# Patient Record
Sex: MALE | Race: WHITE | Employment: OTHER
[De-identification: names, ages, dates, MRNs, and addresses within clinical notes are randomized per-mention and may not be internally consistent; named-entity substitution may affect disease eponyms.]

---

## 2017-01-03 ENCOUNTER — TELEPHONE (OUTPATIENT)
Dept: CASE MANAGEMENT | Age: 67
End: 2017-01-03

## 2017-04-25 ENCOUNTER — TELEPHONE (OUTPATIENT)
Dept: PULMONOLOGY | Age: 67
End: 2017-04-25

## 2017-04-25 DIAGNOSIS — R59.0 MEDIASTINAL ADENOPATHY: Primary | ICD-10-CM

## 2017-05-04 ENCOUNTER — HOSPITAL ENCOUNTER (OUTPATIENT)
Dept: CT IMAGING | Age: 67
Discharge: HOME | End: 2017-05-04
Payer: MEDICARE

## 2017-05-04 DIAGNOSIS — R59.0 MEDIASTINAL ADENOPATHY: ICD-10-CM

## 2017-05-04 PROCEDURE — 71250 CT THORAX DX C-: CPT

## 2017-05-25 ENCOUNTER — OFFICE VISIT (OUTPATIENT)
Dept: PULMONOLOGY | Age: 67
End: 2017-05-25
Payer: MEDICARE

## 2017-05-25 VITALS
RESPIRATION RATE: 18 BRPM | SYSTOLIC BLOOD PRESSURE: 138 MMHG | HEART RATE: 66 BPM | TEMPERATURE: 98.2 F | DIASTOLIC BLOOD PRESSURE: 86 MMHG | BODY MASS INDEX: 36.36 KG/M2 | HEIGHT: 70 IN | WEIGHT: 254 LBS | OXYGEN SATURATION: 94 %

## 2017-05-25 DIAGNOSIS — G25.81 RLS (RESTLESS LEGS SYNDROME): ICD-10-CM

## 2017-05-25 DIAGNOSIS — E66.9 RESTRICTIVE LUNG DISEASE SECONDARY TO OBESITY: ICD-10-CM

## 2017-05-25 DIAGNOSIS — Z99.89 OSA ON CPAP: Primary | ICD-10-CM

## 2017-05-25 DIAGNOSIS — F17.200 TOBACCO DEPENDENCE: ICD-10-CM

## 2017-05-25 DIAGNOSIS — G47.33 OSA ON CPAP: Primary | ICD-10-CM

## 2017-05-25 DIAGNOSIS — J44.9 CHRONIC OBSTRUCTIVE PULMONARY DISEASE, UNSPECIFIED COPD TYPE (HCC): ICD-10-CM

## 2017-05-25 DIAGNOSIS — J98.4 RESTRICTIVE LUNG DISEASE SECONDARY TO OBESITY: ICD-10-CM

## 2017-05-25 PROCEDURE — 3023F SPIROM DOC REV: CPT | Performed by: INTERNAL MEDICINE

## 2017-05-25 PROCEDURE — G8926 SPIRO NO PERF OR DOC: HCPCS | Performed by: INTERNAL MEDICINE

## 2017-05-25 PROCEDURE — 99214 OFFICE O/P EST MOD 30 MIN: CPT | Performed by: INTERNAL MEDICINE

## 2017-05-25 PROCEDURE — 4004F PT TOBACCO SCREEN RCVD TLK: CPT | Performed by: INTERNAL MEDICINE

## 2017-05-25 PROCEDURE — G8417 CALC BMI ABV UP PARAM F/U: HCPCS | Performed by: INTERNAL MEDICINE

## 2017-05-25 PROCEDURE — G8427 DOCREV CUR MEDS BY ELIG CLIN: HCPCS | Performed by: INTERNAL MEDICINE

## 2017-05-25 PROCEDURE — 3017F COLORECTAL CA SCREEN DOC REV: CPT | Performed by: INTERNAL MEDICINE

## 2017-11-16 ENCOUNTER — TELEPHONE (OUTPATIENT)
Dept: PULMONOLOGY | Age: 67
End: 2017-11-16

## 2017-11-16 ENCOUNTER — OFFICE VISIT (OUTPATIENT)
Dept: PULMONOLOGY | Age: 67
End: 2017-11-16
Payer: MEDICARE

## 2017-11-16 VITALS
BODY MASS INDEX: 35.56 KG/M2 | TEMPERATURE: 97.8 F | HEART RATE: 60 BPM | WEIGHT: 254 LBS | HEIGHT: 71 IN | DIASTOLIC BLOOD PRESSURE: 78 MMHG | SYSTOLIC BLOOD PRESSURE: 160 MMHG | RESPIRATION RATE: 16 BRPM | OXYGEN SATURATION: 93 %

## 2017-11-16 DIAGNOSIS — E66.9 CLASS 2 OBESITY WITH BODY MASS INDEX (BMI) OF 35.0 TO 35.9 IN ADULT, UNSPECIFIED OBESITY TYPE, UNSPECIFIED WHETHER SERIOUS COMORBIDITY PRESENT: ICD-10-CM

## 2017-11-16 DIAGNOSIS — Z99.89 OSA ON CPAP: Primary | ICD-10-CM

## 2017-11-16 DIAGNOSIS — G25.81 RLS (RESTLESS LEGS SYNDROME): ICD-10-CM

## 2017-11-16 DIAGNOSIS — J43.9 MIXED RESTRICTIVE AND OBSTRUCTIVE LUNG DISEASE (HCC): ICD-10-CM

## 2017-11-16 DIAGNOSIS — R91.1 LUNG NODULE: ICD-10-CM

## 2017-11-16 DIAGNOSIS — J44.9 CHRONIC OBSTRUCTIVE PULMONARY DISEASE, UNSPECIFIED COPD TYPE (HCC): ICD-10-CM

## 2017-11-16 DIAGNOSIS — G47.33 OSA ON CPAP: Primary | ICD-10-CM

## 2017-11-16 DIAGNOSIS — J98.4 MIXED RESTRICTIVE AND OBSTRUCTIVE LUNG DISEASE (HCC): ICD-10-CM

## 2017-11-16 DIAGNOSIS — F17.200 TOBACCO DEPENDENCE: ICD-10-CM

## 2017-11-16 PROCEDURE — 4040F PNEUMOC VAC/ADMIN/RCVD: CPT | Performed by: INTERNAL MEDICINE

## 2017-11-16 PROCEDURE — G8417 CALC BMI ABV UP PARAM F/U: HCPCS | Performed by: INTERNAL MEDICINE

## 2017-11-16 PROCEDURE — 99215 OFFICE O/P EST HI 40 MIN: CPT | Performed by: INTERNAL MEDICINE

## 2017-11-16 PROCEDURE — 1123F ACP DISCUSS/DSCN MKR DOCD: CPT | Performed by: INTERNAL MEDICINE

## 2017-11-16 PROCEDURE — 3023F SPIROM DOC REV: CPT | Performed by: INTERNAL MEDICINE

## 2017-11-16 PROCEDURE — G8482 FLU IMMUNIZE ORDER/ADMIN: HCPCS | Performed by: INTERNAL MEDICINE

## 2017-11-16 PROCEDURE — 3017F COLORECTAL CA SCREEN DOC REV: CPT | Performed by: INTERNAL MEDICINE

## 2017-11-16 PROCEDURE — 4004F PT TOBACCO SCREEN RCVD TLK: CPT | Performed by: INTERNAL MEDICINE

## 2017-11-16 PROCEDURE — G8926 SPIRO NO PERF OR DOC: HCPCS | Performed by: INTERNAL MEDICINE

## 2017-11-16 PROCEDURE — G8428 CUR MEDS NOT DOCUMENT: HCPCS | Performed by: INTERNAL MEDICINE

## 2017-11-16 NOTE — PROGRESS NOTES
PULMONARY OP  PROGRESS NOTE      Patient:  Anna Massey  YOB: 1950    MRN: [id number]     Acct:        Pt seen and Chart reviewed. Mr/Ms Anna Massey is here in followup for     1. WAN on CPAP     2. Chronic obstructive pulmonary disease, unspecified COPD type (HCC)     3. Lung nodule     4. RLS (restless legs syndrome)     5. Mixed restrictive and obstructive lung disease (HCC)     6. Class 2 obesity with body mass index (BMI) of 35.0 to 35.9 in adult, unspecified obesity type, unspecified whether serious comorbidity present        Here for follow up for WAN, copd, restrictive disease, obesity, tobacco dependence, RLS and lung nodule  Using CPAP every night and could not sleep without it, he is on auto cpap and data available from cpap and his AHI is still 22 and CA 8/ hr also, on methadone, c/o daytime fatigue and tiredness, denies any problem with cpap and on auto cpap with P min of 6 and P max of 16 cm  Stable TAI only and is chronic but not as active and denies any worsening cough, wheezing or worsening SOB, no orthopnea and PND, doing well  He use albuterol as needed and almost twice daily  Still smoking cigarette and 3/4 PPD per day  He had screening Ct  which showed he has 6 mm lung nodule and paratracheal LN scheduled for repeat ct scan and last ct scan showed stable lung nodule and should get ct scan in April 2018  His RLS symptoms are well controlled on requip   Patient has been doing well since last visit. Pt has not been hospitalized or been to er since last visit. Has been using meds as recommended. No dry mouth upon awakening. Positive Fatigue and tiredness during the day. Goes to sleep at 11 30 pm to 1 am, wakes up 5 to 6 am. It takes few minutes to fall asleep. Takes no nap during the day . No headache in am. No car wrecks or near wrecks because of the sleepiness. No nodding off while driving. No weight gain.  No forgetfulness or decreased concentration. No nasal congestion or obstruction at night. Using CPAP 6-8 hr/night. No leg jerks during sleep. No restless feelings in legs at nigh ( takes requip)t. No numbness or burning in leg or feet. No leg aches or cramps . No loss of muscle strength when angry or laugh. No hallucination when dozing off or waking up from sleep. No paralysis upon awakening from sleep or going to sleep. No teeth grinding, nightmares, sleep walking. No night time panic attacks. ESS is 11    ESS:   Sitting and reading 1  Watching TV 3  Sitting inactive in a public place (e.g a theater or a meeting) 1  As a passenger in a car for an hour without a break 0  Lying down to rest in the afternoon when circumstances permit 3  Sitting and talking to someone 1  Sitting quietly after a lunch without alcohol 2  In a car, while stopped for a few minutes in traffic 0    Subjective:   Review of Systems - General ROS: negative and positive for fatigue  ENT ROS: negative for post nasal drip and nasal congestion  Allergy and Immunology ROS: negative  Respiratory ROS: no cough, no shortness of breath on exertion , no wheezing  Cardiovascular ROS: no chest pain or dyspnea on exertion  Gastrointestinal ROS: no abdominal pain, change in bowel habits, or black or bloody stools  Musculoskeletal ROS: positive for back pain  NO snoring, positive excessive daytime sleepiness, disrupted sleep, no naps  CNS: negative for dizziness, diplopia, weakness, syncope  Skin: no rash  : negative for hematuria, nocturia and dysuria  Hem/Onc: no easy bruising and no easy bleeding    Allergies:  No Known Allergies    Medications:    Current Outpatient Prescriptions:     Oxiconazole Nitrate 1 % CREA, Apply topically, Disp: , Rfl:     Ciclopirox 1 % SHAM, Apply topically, Disp: , Rfl:     metroNIDAZOLE (METROGEL) 1 % gel, Apply topically daily Apply topically daily. , Disp: , Rfl:     gabapentin (NEURONTIN) 800 MG tablet, Take 800 mg by clicks or gallops  Abdomen - soft, nontender, nondistended, no masses or organomegaly  Neurological - alert, oriented, normal speech, no focal findings or movement disorder noted}  Extremities - peripheral pulses normal, + pedal edema, no clubbing or cyanosis  Skin - normal coloration and turgor, no rashes, no suspicious skin lesions noted     Labs:  None    CBC: No results for input(s): WBC, HGB, PLT in the last 72 hours. BMP:  No results for input(s): NA, K, CL, CO2, BUN, CREATININE, GLUCOSE in the last 72 hours. S. Calcium:No results for input(s): CALCIUM in the last 72 hours. S. Ionized Calcium:No results for input(s): IONCA in the last 72 hours. S. Magnesium:No results for input(s): MG in the last 72 hours. S. Phosphorus:No results for input(s): PHOS in the last 72 hours. S. Glucose:No results for input(s): POCGLU in the last 72 hours. Glycosylated hemoglobin A1C: No results for input(s): LABA1C in the last 72 hours. Pulmonary Functions Testing Results:  DATE: 10/11/2016     INTERPRETATION:  Flow volume loop shows a restrictive defect. FEV1 is 67% of predicted. FVC is 62% of predicted. Ratio of FEV1 to FVC is 81. Lung volumes by body  box total lung capacity 80% of predicted, RV is 121% of predicted,  diffusion capacity is 71% of predicted. No results found for: FEV1, FVC, FYU1WFG, TLC, DLCO    Blood Gases: No results found for: PH, PCO2, PO2, HCO3, O2SAT    Radiological reports:    CXR      CT Scans      5/4/17  1. Stable benign 6 mm pulmonary nodule right upper lobe   2. Stable prominent mediastinal lymph nodes       4/25/2016                                      1. 6 mm noncalcified pulmonary nodule right upper lobe   2.  Prominent left hilar lymph node and enlarged pretracheal lymph node     I have reviewed the Ct chest and R paratracheal LN is small and likley benign and L Hilar LN difficult to evaluate without contrast      EKG:   ECHO:     Compliance data seen from 10/15/17 to 11/13/17 30 days  97% compliance on auto cpap 16/ 6 cm AHI 22 Central apnea 8/hr min  average use 6H and 4 min      Compliance data seen from march to April  30 days  100% compliance on cpap 13 cm AHI 6 and average use 6H and 18      Assessment:    ICD-10-CM ICD-9-CM    1. WAN on CPAP G47.33 327.23     Z99.89 V46.8    2. Chronic obstructive pulmonary disease, unspecified COPD type ([facility] J44.9 496    3. Lung nodule R91.1 793.11    4. RLS (restless legs syndrome) G25.81 333.94    5. Mixed restrictive and obstructive lung disease ([facility] [address] [facility] J44.9 496     J98.4 518.89    6.  Class 2 obesity with body mass index (BMI) of 35.0 to 35.9 in adult, unspecified obesity type, unspecified whether serious comorbidity present E66.9 278.00     Z68.35 V85.35      CT CHEST FOR FOLLOW UP OF RUL LUNG NODULE AND MEDIASTINAL ADENOPATHY SEEN AND NO CHANGE IN R PARATRACHEAL LN AND NO CHANGE IN RUL LUNG NODULE AS COMPARE TO CT IN April AND NOV 2016 AND NOW IN MAY 2017    PLAN:    WILL NEED FOLLOW UP FOR 2 YEARS AND THEN ANNUAL CT SCAN FOR SCREENING, NEXT CT SCAN IN April 2018  SMOKING CESSATION RECOMMENDED AND DISCUSSED TODAY  PFT'S SEEN FROM 2016 AND WILL SCHEDULE NEXT VISIT  START SPIRIVA RESPIMAT  CONTINUE ALBUTEROL AS NEEDED  CONTINUE REQUIP AT NIGHT    AUTO CPAP AT LEAST 4 HRS QHS  WILL SCHEDULE FOR TITRATION STUDY AS HE IS STILL HAVE AHI OF 22 ON COMPLIANCE DATA AND CENTRAL APNEAS AND ALSO ON METHADONE AND SYMPTOMATIC AND MAY NEED BIPAP WITH BACK RATE   WT LOSS IS RECOMMENDED AND DISCUSSED  FOLLOW GOOD SLEEP HYGEINE INSTRUCTIONS  USE HUMIDIFIER   Rx FOR CPAP SUPPLIES  QUESTIONS ANSWERED PERTAINING TO DIAGNOSIS AND MANAGEMENT  EXPLAINED IMPORTANCE OF COMPLIANCE WITH THERAPY  PLEASE GET COMPLIANCE DATA ONCE AFTER THE TITRATION STUDY DONE    UPTODATE ON FLU VACCINE AND RECOMMENDED ANNUAL FLU VACCINE  UPTODATE ON PNEUMONIA VACCINE HE GETS FROM PCP  RTC IN 6  MONTHS      Riaz Johnston MD             11/16/2017, 9:34 AM    Low Dose CT (LDCT) Lung Screening

## 2017-12-13 ENCOUNTER — HOSPITAL ENCOUNTER (OUTPATIENT)
Dept: SLEEP CENTER | Age: 67
Discharge: HOME | End: 2017-12-13
Payer: MEDICARE

## 2017-12-13 DIAGNOSIS — G47.33 OSA ON CPAP: ICD-10-CM

## 2017-12-13 DIAGNOSIS — Z99.89 OSA ON CPAP: ICD-10-CM

## 2017-12-13 PROCEDURE — 95811 POLYSOM 6/>YRS CPAP 4/> PARM: CPT

## 2017-12-13 ASSESSMENT — SLEEP AND FATIGUE QUESTIONNAIRES
HOW LIKELY ARE YOU TO NOD OFF OR FALL ASLEEP WHILE SITTING QUIETLY AFTER LUNCH WITHOUT ALCOHOL: 0
HOW LIKELY ARE YOU TO NOD OFF OR FALL ASLEEP WHEN YOU ARE A PASSENGER IN A CAR FOR AN HOUR WITHOUT A BREAK: 0
HOW LIKELY ARE YOU TO NOD OFF OR FALL ASLEEP WHILE SITTING AND TALKING TO SOMEONE: 0
HOW LIKELY ARE YOU TO NOD OFF OR FALL ASLEEP WHILE LYING DOWN TO REST IN THE AFTERNOON WHEN CIRCUMSTANCES PERMIT: 0
HOW LIKELY ARE YOU TO NOD OFF OR FALL ASLEEP IN A CAR, WHILE STOPPED FOR A FEW MINUTES IN TRAFFIC: 0
ESS TOTAL SCORE: 4
HOW LIKELY ARE YOU TO NOD OFF OR FALL ASLEEP WHILE SITTING INACTIVE IN A PUBLIC PLACE: 0
HOW LIKELY ARE YOU TO NOD OFF OR FALL ASLEEP WHILE WATCHING TV: 3
HOW LIKELY ARE YOU TO NOD OFF OR FALL ASLEEP WHILE SITTING AND READING: 1

## 2017-12-14 NOTE — PROGRESS NOTES
Patient Name: Joy Acosta   : 1950     Admitting diagnosis: Obstructive sleep apnea (adult) (pediatric) [G47.33]       Medical History:   Past Medical History:   Diagnosis Date    Back pain     Coronary stent     Hypertension     Unspecified sleep apnea            Patient information faxed to [address] [place] [address]. This includes the face sheet, H&P, diagnostic test results and prescription for equipment and pressures.     Fabiola Mcmullen

## 2017-12-21 NOTE — PROGRESS NOTES
[address], [address] [place] [address] [address] [address]                                    SLEEP STUDY    PATIENT NAME: Nanci Felty                      :        1950  MED REC NO:   [id number]                              ROOM:  ACCOUNT NO:   [de-identified]                           ADMIT DATE: 2017  PROVIDER:     Jayde Maza [facility]    SLEEP IMPROVEMENT [place]  INTERPRETATION OF CLINICAL POLYSOMNOGRAPHY    DATE OF STUDY:  2017    REFERRING PROVIDER:  Dr. Nikko Ford:  1. Obstructive sleep apnea syndrome. 2.  Obesity. 3.  Periodic leg movements. PROCEDURE STANDARD:  It was a 1-night CPAP titration. Polysomnography revealed that the patient spent 399 minutes in bed with a  total sleep time of 322 minutes. Sleep efficiency was reduced to 80%. Latency of sleep was normal at 9 minutes. There were 41 sleep stage  changes. There were 13 awakenings during the night. Sleep architecture was normal with 4% stage I, 80% stage II, 0% delta, and  70% REM. Latency to various sleep stages were normal.    Polysomnography did not reveal any other abnormality. Electrocardiogram did not reveal any arrhythmia. Respiratory evaluation revealed while on the CPAP at a final pressure of 15  cm water, the patient had no respiratory event. The lowest oxygen  saturation at these settings being 90%. Movement disorder evaluation revealed mild degree of periodic leg  movements. IMPRESSION:  1. Obstructive sleep apnea syndrome. 2.  Periodic leg movements. 3.  Obesity. 4.  COPD. RECOMMENDATION:  The patient is known to have significant degree of sleep  apnea syndrome that responded well to the use of CPAP with a final pressure  15 cm of water. It is recommended that the patient should continue the use  of CPAP at the present settings.   CPAP by relieving the apnea should  improve his daytime symptoms and also protect the patient from the  morbidity associated with the apnea. The patient should be encouraged to lose weight. The patient should be instructed in proper sleep hygiene techniques. The patient advised to use a heated humidifier along with the CPAP machine  to prevent upper airway dryness. The patient has mild degree of periodic leg movements and does not require  any treatment at this time. The patient has COPD, although new or persistent with general desaturation  was noted. Fabiano Zabala    D:12/20/2017 14:15:13               T: 12/21/2017 7:14:44     [id number]  Job#: [id number]     Doc#: [id number]

## 2018-04-10 ENCOUNTER — HOSPITAL ENCOUNTER (OUTPATIENT)
Dept: PULMONOLOGY | Age: 68
Discharge: HOME | End: 2018-04-10
Payer: MEDICARE

## 2018-04-10 ENCOUNTER — HOSPITAL ENCOUNTER (OUTPATIENT)
Dept: CT IMAGING | Age: 68
Discharge: HOME | End: 2018-04-12
Payer: MEDICARE

## 2018-04-10 DIAGNOSIS — R91.1 LUNG NODULE: ICD-10-CM

## 2018-04-10 PROCEDURE — 6360000002 HC RX W HCPCS: Performed by: INTERNAL MEDICINE

## 2018-04-10 PROCEDURE — 94060 EVALUATION OF WHEEZING: CPT

## 2018-04-10 PROCEDURE — 94726 PLETHYSMOGRAPHY LUNG VOLUMES: CPT

## 2018-04-10 PROCEDURE — 71250 CT THORAX DX C-: CPT

## 2018-04-10 PROCEDURE — 94729 DIFFUSING CAPACITY: CPT

## 2018-04-10 PROCEDURE — 94664 DEMO&/EVAL PT USE INHALER: CPT

## 2018-04-10 RX ORDER — ALBUTEROL SULFATE 2.5 MG/3ML
2.5 SOLUTION RESPIRATORY (INHALATION) ONCE
Status: COMPLETED | OUTPATIENT
Start: 2018-04-10 | End: 2018-04-10

## 2018-04-10 RX ADMIN — ALBUTEROL SULFATE 2.5 MG: 2.5 SOLUTION RESPIRATORY (INHALATION) at 10:31

## 2018-04-26 ENCOUNTER — OFFICE VISIT (OUTPATIENT)
Dept: PULMONOLOGY | Age: 68
End: 2018-04-26
Payer: MEDICARE

## 2018-04-26 VITALS
HEIGHT: 71 IN | OXYGEN SATURATION: 92 % | BODY MASS INDEX: 36.54 KG/M2 | WEIGHT: 261 LBS | RESPIRATION RATE: 16 BRPM | TEMPERATURE: 98.1 F | HEART RATE: 55 BPM | SYSTOLIC BLOOD PRESSURE: 161 MMHG | DIASTOLIC BLOOD PRESSURE: 85 MMHG

## 2018-04-26 DIAGNOSIS — G25.81 RLS (RESTLESS LEGS SYNDROME): ICD-10-CM

## 2018-04-26 DIAGNOSIS — Z99.89 OSA ON CPAP: ICD-10-CM

## 2018-04-26 DIAGNOSIS — J44.9 CHRONIC OBSTRUCTIVE PULMONARY DISEASE, UNSPECIFIED COPD TYPE (HCC): Primary | ICD-10-CM

## 2018-04-26 DIAGNOSIS — E66.9 OBESITY, UNSPECIFIED CLASSIFICATION, UNSPECIFIED OBESITY TYPE, UNSPECIFIED WHETHER SERIOUS COMORBIDITY PRESENT: ICD-10-CM

## 2018-04-26 DIAGNOSIS — G47.33 OSA ON CPAP: ICD-10-CM

## 2018-04-26 DIAGNOSIS — R06.09 DYSPNEA ON EXERTION: ICD-10-CM

## 2018-04-26 DIAGNOSIS — R91.1 LUNG NODULE: ICD-10-CM

## 2018-04-26 PROCEDURE — G8427 DOCREV CUR MEDS BY ELIG CLIN: HCPCS | Performed by: INTERNAL MEDICINE

## 2018-04-26 PROCEDURE — 3023F SPIROM DOC REV: CPT | Performed by: INTERNAL MEDICINE

## 2018-04-26 PROCEDURE — 4040F PNEUMOC VAC/ADMIN/RCVD: CPT | Performed by: INTERNAL MEDICINE

## 2018-04-26 PROCEDURE — 1123F ACP DISCUSS/DSCN MKR DOCD: CPT | Performed by: INTERNAL MEDICINE

## 2018-04-26 PROCEDURE — 99214 OFFICE O/P EST MOD 30 MIN: CPT | Performed by: INTERNAL MEDICINE

## 2018-04-26 PROCEDURE — G8926 SPIRO NO PERF OR DOC: HCPCS | Performed by: INTERNAL MEDICINE

## 2018-04-26 PROCEDURE — 3017F COLORECTAL CA SCREEN DOC REV: CPT | Performed by: INTERNAL MEDICINE

## 2018-04-26 PROCEDURE — G8417 CALC BMI ABV UP PARAM F/U: HCPCS | Performed by: INTERNAL MEDICINE

## 2018-04-26 PROCEDURE — 4004F PT TOBACCO SCREEN RCVD TLK: CPT | Performed by: INTERNAL MEDICINE

## 2018-04-26 RX ORDER — ESCITALOPRAM OXALATE 20 MG/1
20 TABLET ORAL DAILY
COMMUNITY

## 2018-09-07 RX ORDER — FLUTICASONE PROPIONATE 50 MCG
1 SPRAY, SUSPENSION (ML) NASAL PRN
COMMUNITY

## 2018-09-19 ENCOUNTER — OFFICE VISIT (OUTPATIENT)
Dept: UROLOGY | Age: 68
End: 2018-09-19
Payer: MEDICARE

## 2018-09-19 VITALS — BODY MASS INDEX: 36.68 KG/M2 | DIASTOLIC BLOOD PRESSURE: 72 MMHG | SYSTOLIC BLOOD PRESSURE: 140 MMHG | WEIGHT: 263 LBS

## 2018-09-19 DIAGNOSIS — N40.2 PROSTATE NODULE: Primary | ICD-10-CM

## 2018-09-19 PROCEDURE — 1123F ACP DISCUSS/DSCN MKR DOCD: CPT | Performed by: UROLOGY

## 2018-09-19 PROCEDURE — 4040F PNEUMOC VAC/ADMIN/RCVD: CPT | Performed by: UROLOGY

## 2018-09-19 PROCEDURE — G8427 DOCREV CUR MEDS BY ELIG CLIN: HCPCS | Performed by: UROLOGY

## 2018-09-19 PROCEDURE — 4004F PT TOBACCO SCREEN RCVD TLK: CPT | Performed by: UROLOGY

## 2018-09-19 PROCEDURE — 3017F COLORECTAL CA SCREEN DOC REV: CPT | Performed by: UROLOGY

## 2018-09-19 PROCEDURE — G8417 CALC BMI ABV UP PARAM F/U: HCPCS | Performed by: UROLOGY

## 2018-09-19 PROCEDURE — 99204 OFFICE O/P NEW MOD 45 MIN: CPT | Performed by: UROLOGY

## 2018-09-19 PROCEDURE — 1101F PT FALLS ASSESS-DOCD LE1/YR: CPT | Performed by: UROLOGY

## 2018-09-19 RX ORDER — ACETAMINOPHEN 500 MG
500 TABLET ORAL EVERY 6 HOURS PRN
COMMUNITY

## 2018-09-19 ASSESSMENT — ENCOUNTER SYMPTOMS
COLOR CHANGE: 0
EYE PAIN: 0
BACK PAIN: 0
COUGH: 0
SHORTNESS OF BREATH: 0
NAUSEA: 0
WHEEZING: 0
ABDOMINAL PAIN: 0
EYE REDNESS: 0
VOMITING: 0

## 2018-09-19 NOTE — PROGRESS NOTES
Subjective:      Patient ID: Gomez Hall is a 79 y.o. male. HPI  Patient is a 79 y.o. male in no acute distress and alert and oriented to person, place and time. Patient being seen here today as a new patient. Patient did have a recent MRI of his hip. During this MRI his prostate was visualized. Radiology did make a comment that there was a hypoechoic area in the anterior portion of the prostate. Patient did have a recent PSA testing done on September 5, 2018. This is a PSA screening. His PSA was 1.37. Patient does have one paternal uncle with prostate cancer. This was diagnosed in his 76s. Patient has no recent gross hematuria dysuria. Patient has never had any elevations in his PSA according to him. Patient reports no pain today. He's had no unintentional weight loss or decreased appetite. Patient does have back pain and hip pain and leg pain. This is why the MRI of the hip was performed. Otherwise he is pain-free today.     Past Medical History:   Diagnosis Date    Abnormal glucose     Actinic keratosis     Anxiety disorder     Back pain     COPD (chronic obstructive pulmonary disease) (HCC)     Coronary stent     Esophageal reflux     Heart failure, systolic (HCC)     Hyperlipidemia     Hypertension     Hypertension, essential, benign     Lumbar strain     Osteoporosis     Seasonal allergic rhinitis     Unspecified sleep apnea      Past Surgical History:   Procedure Laterality Date    COLONOSCOPY      CORONARY ANGIOPLASTY WITH STENT PLACEMENT      NECK SURGERY      x2    TONSILLECTOMY       Outpatient Encounter Prescriptions as of 9/19/2018   Medication Sig Dispense Refill    acetaminophen (TYLENOL) 500 MG tablet Take 500 mg by mouth every 6 hours as needed for Pain      fluticasone (FLONASE) 50 MCG/ACT nasal spray 1 spray by Each Nare route daily      escitalopram (LEXAPRO) 20 MG tablet Take 20 mg by mouth daily      Umeclidinium Bromide (INCRUSE ELLIPTA) 62.5 MCG/INH AEPB Inhale 1 puff into the lungs daily 1 each 11    Oxiconazole Nitrate 1 % CREA Apply topically      Ciclopirox 1 % SHAM Apply topically      metroNIDAZOLE (METROGEL) 1 % gel Apply topically daily Apply topically daily.  gabapentin (NEURONTIN) 800 MG tablet Take 800 mg by mouth 3 times daily      Albuterol Sulfate (VENTOLIN HFA IN) Inhale 90 mcg into the lungs 2 times daily.  imipramine (TOFRANIL) 25 MG tablet Take 25 mg by mouth nightly.  aspirin 81 MG EC tablet Take 81 mg by mouth daily.  atorvastatin (LIPITOR) 10 MG tablet Take 30 mg by mouth daily.  metoprolol (TOPROL-XL) 50 MG XL tablet Take 100 mg by mouth daily.  nitroGLYCERIN (NITROSTAT) 0.4 MG SL tablet Place 0.4 mg under the tongue every 5 minutes as needed.  ropinirole (REQUIP) 1 MG tablet Take 1 mg by mouth nightly.  methadone (DOLOPHINE) 5 MG tablet Take 5 mg by mouth 2 times daily. No facility-administered encounter medications on file as of 9/19/2018. Current Outpatient Prescriptions on File Prior to Visit   Medication Sig Dispense Refill    fluticasone (FLONASE) 50 MCG/ACT nasal spray 1 spray by Each Nare route daily      escitalopram (LEXAPRO) 20 MG tablet Take 20 mg by mouth daily      Umeclidinium Bromide (INCRUSE ELLIPTA) 62.5 MCG/INH AEPB Inhale 1 puff into the lungs daily 1 each 11    Oxiconazole Nitrate 1 % CREA Apply topically      Ciclopirox 1 % SHAM Apply topically      metroNIDAZOLE (METROGEL) 1 % gel Apply topically daily Apply topically daily.  gabapentin (NEURONTIN) 800 MG tablet Take 800 mg by mouth 3 times daily      Albuterol Sulfate (VENTOLIN HFA IN) Inhale 90 mcg into the lungs 2 times daily.  imipramine (TOFRANIL) 25 MG tablet Take 25 mg by mouth nightly.  aspirin 81 MG EC tablet Take 81 mg by mouth daily.  atorvastatin (LIPITOR) 10 MG tablet Take 30 mg by mouth daily.         metoprolol (TOPROL-XL) 50 MG XL tablet Take 100 mg by mouth daily.  nitroGLYCERIN (NITROSTAT) 0.4 MG SL tablet Place 0.4 mg under the tongue every 5 minutes as needed.  ropinirole (REQUIP) 1 MG tablet Take 1 mg by mouth nightly.  methadone (DOLOPHINE) 5 MG tablet Take 5 mg by mouth 2 times daily. No current facility-administered medications on file prior to visit. Patient has no known allergies. Family History   Problem Relation Age of Onset    Kidney Disease Mother      History   Smoking Status    Current Every Day Smoker    Packs/day: 1.00    Years: 40.00   Smokeless Tobacco    Never Used        History   Alcohol Use No       Vitals:    09/19/18 0856   BP: (!) 140/72     Constitutional: Patient in no acute distress; Neuro: alert and oriented to person place and time. Psych: Mood and affect normal.  Skin: Normal  Lungs: Respiratory effort normal  Cardiovascular:  Normal peripheral pulses  Abdomen: Soft, non-tender, non-distended with no CVA, flank pain, hepatosplenomegaly or hernia. Kidneys normal.  Bladder non-tender and not distended. Lymphatics: no palpable lymphadenopathy  Penis normal and circumcised  Urethral meatus normal  Scrotal exam normal  Testicles normal bilaterally  Epididymis normal bilaterally  No evidence of inguinal hernia  Anus and perineum normal  Normal rectal tone with no masses  Prostate soft, non-tender to palpation. No palpable nodules. Estimated 40 grams. Seminal vesicles not palpable. No results found for this visit on 09/19/18. No results found for: PSA  No results found for: BUN  No results found for: CREATININE  Review of Systems   Constitutional: Negative for appetite change, chills and fever. Eyes: Negative for pain, redness and visual disturbance. Respiratory: Negative for cough, shortness of breath and wheezing. Cardiovascular: Negative for chest pain and leg swelling. Gastrointestinal: Negative for abdominal pain, nausea and vomiting.    Genitourinary: Negative

## 2018-10-25 ENCOUNTER — OFFICE VISIT (OUTPATIENT)
Dept: PULMONOLOGY | Age: 68
End: 2018-10-25
Payer: MEDICARE

## 2018-10-25 VITALS
BODY MASS INDEX: 36.68 KG/M2 | RESPIRATION RATE: 16 BRPM | WEIGHT: 262 LBS | HEART RATE: 79 BPM | DIASTOLIC BLOOD PRESSURE: 67 MMHG | HEIGHT: 71 IN | SYSTOLIC BLOOD PRESSURE: 138 MMHG | TEMPERATURE: 98.2 F | OXYGEN SATURATION: 92 %

## 2018-10-25 DIAGNOSIS — J44.9 CHRONIC OBSTRUCTIVE PULMONARY DISEASE, UNSPECIFIED COPD TYPE (HCC): Primary | ICD-10-CM

## 2018-10-25 DIAGNOSIS — F17.218 CIGARETTE NICOTINE DEPENDENCE WITH OTHER NICOTINE-INDUCED DISORDER: ICD-10-CM

## 2018-10-25 DIAGNOSIS — G47.33 OSA ON CPAP: ICD-10-CM

## 2018-10-25 DIAGNOSIS — Z87.891 PERSONAL HISTORY OF TOBACCO USE: ICD-10-CM

## 2018-10-25 DIAGNOSIS — E66.9 OBESITY (BMI 35.0-39.9 WITHOUT COMORBIDITY): ICD-10-CM

## 2018-10-25 DIAGNOSIS — Z99.89 OSA ON CPAP: ICD-10-CM

## 2018-10-25 DIAGNOSIS — R91.1 LUNG NODULE: ICD-10-CM

## 2018-10-25 DIAGNOSIS — F17.200 TOBACCO DEPENDENCE: ICD-10-CM

## 2018-10-25 PROCEDURE — 4040F PNEUMOC VAC/ADMIN/RCVD: CPT | Performed by: INTERNAL MEDICINE

## 2018-10-25 PROCEDURE — G8484 FLU IMMUNIZE NO ADMIN: HCPCS | Performed by: INTERNAL MEDICINE

## 2018-10-25 PROCEDURE — 1123F ACP DISCUSS/DSCN MKR DOCD: CPT | Performed by: INTERNAL MEDICINE

## 2018-10-25 PROCEDURE — G8417 CALC BMI ABV UP PARAM F/U: HCPCS | Performed by: INTERNAL MEDICINE

## 2018-10-25 PROCEDURE — G8427 DOCREV CUR MEDS BY ELIG CLIN: HCPCS | Performed by: INTERNAL MEDICINE

## 2018-10-25 PROCEDURE — 1101F PT FALLS ASSESS-DOCD LE1/YR: CPT | Performed by: INTERNAL MEDICINE

## 2018-10-25 PROCEDURE — 3017F COLORECTAL CA SCREEN DOC REV: CPT | Performed by: INTERNAL MEDICINE

## 2018-10-25 PROCEDURE — G0296 VISIT TO DETERM LDCT ELIG: HCPCS | Performed by: INTERNAL MEDICINE

## 2018-10-25 PROCEDURE — G8926 SPIRO NO PERF OR DOC: HCPCS | Performed by: INTERNAL MEDICINE

## 2018-10-25 PROCEDURE — 99214 OFFICE O/P EST MOD 30 MIN: CPT | Performed by: INTERNAL MEDICINE

## 2018-10-25 PROCEDURE — 3023F SPIROM DOC REV: CPT | Performed by: INTERNAL MEDICINE

## 2018-10-25 PROCEDURE — 4004F PT TOBACCO SCREEN RCVD TLK: CPT | Performed by: INTERNAL MEDICINE

## 2018-10-25 NOTE — PROGRESS NOTES
in no distress  Mental status - alert, oriented to person, place, and time  Eyes - pupils equal and reactive, extraocular eye movements intact  Nose - normal and patent, no erythema, discharge or polyps  Mouth - mucous membranes moist, pharynx normal without lesions, Small oropharynx, large tongue, Mallampati 2  Neck - supple, no significant adenopathy, thick and short  Chest - No distress, retractions or cyanosis, symmetrical chest movement and clear to auscultation, no wheezes, rales or rhonchi, symmetric air entry  Heart - normal rate, regular rhythm, normal S1, S2, no murmurs, rubs, clicks or gallops  Abdomen - soft, nontender, nondistended, no masses or organomegaly  Neurological - alert, oriented, normal speech, no focal findings or movement disorder noted  Extremities - peripheral pulses normal, + pedal edema, no clubbing or cyanosis  Skin - normal coloration and turgor, no rashes, no suspicious skin lesions noted     Labs:  None    CBC: No results for input(s): WBC, HGB, PLT in the last 72 hours. BMP:  No results for input(s): NA, K, CL, CO2, BUN, CREATININE, GLUCOSE in the last 72 hours. S. Calcium:No results for input(s): CALCIUM in the last 72 hours. S. Ionized Calcium:No results for input(s): IONCA in the last 72 hours. S. Magnesium:No results for input(s): MG in the last 72 hours. S. Phosphorus:No results for input(s): PHOS in the last 72 hours. S. Glucose:No results for input(s): POCGLU in the last 72 hours. Glycosylated hemoglobin A1C: No results for input(s): LABA1C in the last 72 hours. Pulmonary Functions Testing Results:    4/10/18  The flow volume loop is restrictive. FEV1 63% predicated, FVC 60%  predicated. There was a 11% bronchodilator change in FEV1.  FEV1/FVC ratio is 79, post bronchodilator 81. Lung volumes by body box show TLC 85% predicted. % predicted. Diffusion capacity is 62% predicated.   Airway resistance normal.    DATE: 10/11/2016     INTERPRETATION:  Flow

## 2018-10-25 NOTE — PATIENT INSTRUCTIONS
do to reduce your risk of dying of lung cancer is to quit. For this screening to be covered by Medicare and most other insurers, strict criteria must be met. If you do not meet these criteria, but still wish to undergo LDCT testing, you will be required to sign a waiver indicating your willingness to pay for the scan.

## 2019-03-14 LAB — PROSTATE SPECIFIC ANTIGEN: 2.14 NG/ML

## 2019-03-20 ENCOUNTER — OFFICE VISIT (OUTPATIENT)
Dept: UROLOGY | Age: 69
End: 2019-03-20
Payer: MEDICARE

## 2019-03-20 VITALS
WEIGHT: 252 LBS | BODY MASS INDEX: 35.28 KG/M2 | HEIGHT: 71 IN | DIASTOLIC BLOOD PRESSURE: 80 MMHG | SYSTOLIC BLOOD PRESSURE: 158 MMHG

## 2019-03-20 DIAGNOSIS — N40.2 PROSTATE NODULE: Primary | ICD-10-CM

## 2019-03-20 PROCEDURE — G8427 DOCREV CUR MEDS BY ELIG CLIN: HCPCS | Performed by: NURSE PRACTITIONER

## 2019-03-20 PROCEDURE — 4040F PNEUMOC VAC/ADMIN/RCVD: CPT | Performed by: NURSE PRACTITIONER

## 2019-03-20 PROCEDURE — G8484 FLU IMMUNIZE NO ADMIN: HCPCS | Performed by: NURSE PRACTITIONER

## 2019-03-20 PROCEDURE — 3017F COLORECTAL CA SCREEN DOC REV: CPT | Performed by: NURSE PRACTITIONER

## 2019-03-20 PROCEDURE — 1101F PT FALLS ASSESS-DOCD LE1/YR: CPT | Performed by: NURSE PRACTITIONER

## 2019-03-20 PROCEDURE — G8417 CALC BMI ABV UP PARAM F/U: HCPCS | Performed by: NURSE PRACTITIONER

## 2019-03-20 PROCEDURE — 4004F PT TOBACCO SCREEN RCVD TLK: CPT | Performed by: NURSE PRACTITIONER

## 2019-03-20 PROCEDURE — 99213 OFFICE O/P EST LOW 20 MIN: CPT | Performed by: NURSE PRACTITIONER

## 2019-03-20 PROCEDURE — 1123F ACP DISCUSS/DSCN MKR DOCD: CPT | Performed by: NURSE PRACTITIONER

## 2019-03-20 ASSESSMENT — ENCOUNTER SYMPTOMS
NAUSEA: 0
ABDOMINAL PAIN: 0
CONSTIPATION: 0
SHORTNESS OF BREATH: 0
BACK PAIN: 0
COUGH: 0
EYE PAIN: 0
EYE REDNESS: 0
COLOR CHANGE: 0
WHEEZING: 0
VOMITING: 0

## 2019-03-21 DIAGNOSIS — N40.2 PROSTATE NODULE: ICD-10-CM

## 2019-04-03 ENCOUNTER — TELEPHONE (OUTPATIENT)
Dept: ONCOLOGY | Age: 69
End: 2019-04-03

## 2019-04-03 NOTE — LETTER
4/3/2019        [address]lo [address]    Dear Marena Skiff:    Your healthcare provider has ordered a low dose CT scan of the chest for lung cancer screening. You will find enclosed, information about CT lung screening. Please review the statement of understanding, you will be asked to sign a copy of this at the time of your CT scan    If you have not already been contacted to make the appointment for your scan, please call our scheduling department at [phone]    Keep in mind that CT lung screening does not take the place of smoking cessation. If you are a current smoker, you will find enclosed smoking cessation resources. Please do not hesitate to contact me if you have any questions or concerns.     [address] [facility],      [facility]  [phone]

## 2019-04-10 ENCOUNTER — HOSPITAL ENCOUNTER (OUTPATIENT)
Dept: CT IMAGING | Age: 69
Discharge: HOME | End: 2019-04-12
Payer: MEDICARE

## 2019-04-10 DIAGNOSIS — Z87.891 PERSONAL HISTORY OF TOBACCO USE: ICD-10-CM

## 2019-04-10 PROCEDURE — G0297 LDCT FOR LUNG CA SCREEN: HCPCS

## 2019-04-25 ENCOUNTER — OFFICE VISIT (OUTPATIENT)
Dept: PULMONOLOGY | Age: 69
End: 2019-04-25
Payer: MEDICARE

## 2019-04-25 VITALS
OXYGEN SATURATION: 94 % | SYSTOLIC BLOOD PRESSURE: 125 MMHG | DIASTOLIC BLOOD PRESSURE: 83 MMHG | RESPIRATION RATE: 22 BRPM | WEIGHT: 246.1 LBS | HEART RATE: 90 BPM | BODY MASS INDEX: 34.45 KG/M2 | HEIGHT: 71 IN | TEMPERATURE: 98.2 F

## 2019-04-25 DIAGNOSIS — G47.33 OSA ON CPAP: ICD-10-CM

## 2019-04-25 DIAGNOSIS — Z99.89 OSA ON CPAP: ICD-10-CM

## 2019-04-25 DIAGNOSIS — J44.9 CHRONIC OBSTRUCTIVE PULMONARY DISEASE, UNSPECIFIED COPD TYPE (HCC): Primary | ICD-10-CM

## 2019-04-25 DIAGNOSIS — F17.200 TOBACCO DEPENDENCE: ICD-10-CM

## 2019-04-25 DIAGNOSIS — R91.1 LUNG NODULE: ICD-10-CM

## 2019-04-25 PROCEDURE — G8926 SPIRO NO PERF OR DOC: HCPCS | Performed by: INTERNAL MEDICINE

## 2019-04-25 PROCEDURE — G8417 CALC BMI ABV UP PARAM F/U: HCPCS | Performed by: INTERNAL MEDICINE

## 2019-04-25 PROCEDURE — 99214 OFFICE O/P EST MOD 30 MIN: CPT | Performed by: INTERNAL MEDICINE

## 2019-04-25 PROCEDURE — 3023F SPIROM DOC REV: CPT | Performed by: INTERNAL MEDICINE

## 2019-04-25 PROCEDURE — 3017F COLORECTAL CA SCREEN DOC REV: CPT | Performed by: INTERNAL MEDICINE

## 2019-04-25 PROCEDURE — 4004F PT TOBACCO SCREEN RCVD TLK: CPT | Performed by: INTERNAL MEDICINE

## 2019-04-25 PROCEDURE — 4040F PNEUMOC VAC/ADMIN/RCVD: CPT | Performed by: INTERNAL MEDICINE

## 2019-04-25 PROCEDURE — 1123F ACP DISCUSS/DSCN MKR DOCD: CPT | Performed by: INTERNAL MEDICINE

## 2019-04-25 PROCEDURE — G8427 DOCREV CUR MEDS BY ELIG CLIN: HCPCS | Performed by: INTERNAL MEDICINE

## 2019-04-25 NOTE — PROGRESS NOTES
day. Goes to sleep at 11 30 pm, wakes up 6 30 am. It takes few minutes to fall asleep. Takes no nap during the day . No headache in am. No car wrecks or near wrecks because of the sleepiness. No nodding off while driving. No weight gain. No forgetfulness or decreased concentration. No nasal congestion or obstruction at night. Using CPAP 6-7 hr/night. No leg jerks during sleep. No restless feelings in legs at nigh ( takes requip)t. No numbness or burning in leg or feet. No leg aches or cramps . No loss of muscle strength when angry or laugh. No hallucination when dozing off or waking up from sleep. No paralysis upon awakening from sleep or going to sleep. No teeth grinding, nightmares, sleep walking. No night time panic attacks. Initially  He was on auto cpap and recently data available from cpap showed his AHI was still 22 and CA 8/ hr also, he is on methadone,  and was on auto cpap with P min of 6 and P max of 16 cm.       ESS is 5    ESS:   Sitting and reading 2  Watching TV 3  Sitting inactive in a public place (e.g a theater or a meeting) 0  As a passenger in a car for an hour without a break 0  Lying down to rest in the afternoon when circumstances permit 0  Sitting and talking to someone 0  Sitting quietly after a lunch without alcohol 0  In a car, while stopped for a few minutes in traffic 0    Subjective:   Review of Systems - General ROS: negative and positive for fatigue  ENT ROS: positive for post nasal drip and nasal congestion  Allergy and Immunology ROS: negative for skin rash and urticaria  Respiratory ROS: no cough, no shortness of breath on exertion , no wheezing  Cardiovascular ROS: no chest pain or dyspnea on exertion  Gastrointestinal ROS: no abdominal pain, change in bowel habits, or black or bloody stools  Musculoskeletal ROS: positive for back pain  NO snoring, positive excessive daytime sleepiness, disrupted sleep, no naps  CNS: negative for dizziness, diplopia, weakness, syncope  Skin: no rash and skin lesion  : negative for hematuria, nocturia and dysuria  Hem/Onc: no easy bruising and no easy bleeding    Allergies:  No Known Allergies    Medications:    Current Outpatient Medications:     acetaminophen (TYLENOL) 500 MG tablet, Take 500 mg by mouth every 6 hours as needed for Pain, Disp: , Rfl:     fluticasone (FLONASE) 50 MCG/ACT nasal spray, 1 spray by Each Nare route daily, Disp: , Rfl:     escitalopram (LEXAPRO) 20 MG tablet, Take 20 mg by mouth daily, Disp: , Rfl:     Umeclidinium Bromide (INCRUSE ELLIPTA) 62.5 MCG/INH AEPB, Inhale 1 puff into the lungs daily, Disp: 1 each, Rfl: 11    Oxiconazole Nitrate 1 % CREA, Apply topically, Disp: , Rfl:     Ciclopirox 1 % SHAM, Apply topically, Disp: , Rfl:     metroNIDAZOLE (METROGEL) 1 % gel, Apply topically daily Apply topically daily. , Disp: , Rfl:     gabapentin (NEURONTIN) 800 MG tablet, Take 800 mg by mouth 3 times daily, Disp: , Rfl:     Albuterol Sulfate (VENTOLIN HFA IN), Inhale 90 mcg into the lungs 2 times daily. , Disp: , Rfl:     imipramine (TOFRANIL) 25 MG tablet, Take 25 mg by mouth nightly.  , Disp: , Rfl:     aspirin 81 MG EC tablet, Take 81 mg by mouth daily. , Disp: , Rfl:     atorvastatin (LIPITOR) 10 MG tablet, Take 30 mg by mouth daily. , Disp: , Rfl:     metoprolol (TOPROL-XL) 50 MG XL tablet, Take 100 mg by mouth daily. , Disp: , Rfl:     ropinirole (REQUIP) 1 MG tablet, Take 1 mg by mouth nightly.  , Disp: , Rfl:     methadone (DOLOPHINE) 5 MG tablet, Take 5 mg by mouth 2 times daily. , Disp: , Rfl:     nitroGLYCERIN (NITROSTAT) 0.4 MG SL tablet, Place 0.4 mg under the tongue every 5 minutes as needed. , Disp: , Rfl:       Objective:    Physical Exam:  Vitals:   /83 (Site: Left Upper Arm, Position: Sitting, Cuff Size: Medium Adult)   Pulse 90   Temp 98.2 °F (36.8 °C) (Tympanic)   Resp 22   Ht 5' 11\" (1.803 m)   Wt 246 lb 1.6 oz (111.6 kg)   SpO2 94%   BMI 34.32 kg/m²   Last 3 weights:   Wt Readings from Last 3 Encounters:   04/25/19 246 lb 1.6 oz (111.6 kg)   03/20/19 252 lb (114.3 kg)   10/25/18 262 lb (118.8 kg)     Body mass index is 34.32 kg/m². Physical Examination:   General appearance - alert, well appearing, and in no distress  Mental status - alert, oriented to person, place, and time  Eyes - pupils equal and reactive, extraocular eye movements intact  Nose - normal and patent, no erythema, discharge or polyps  Mouth - mucous membranes moist, pharynx normal without lesions, Small oropharynx, large tongue, Mallampati 2  Neck - supple, no significant adenopathy, thick and short  Chest - No distress, retractions or cyanosis, symmetrical chest movement and clear to auscultation, no wheezes, rales or rhonchi, symmetric air entry  Heart - normal rate, regular rhythm, normal S1, S2, no murmurs, rubs, clicks or gallops  Abdomen - soft, nontender, nondistended, no masses or organomegaly  Neurological - alert, oriented, normal speech, no focal findings or movement disorder noted  Extremities - peripheral pulses normal, + pedal edema, no clubbing or cyanosis  Skin - normal coloration and turgor, no rashes, no suspicious skin lesions noted     Labs:  None    CBC: No results for input(s): WBC, HGB, PLT in the last 72 hours. BMP:  No results for input(s): NA, K, CL, CO2, BUN, CREATININE, GLUCOSE in the last 72 hours. S. Calcium:No results for input(s): CALCIUM in the last 72 hours. S. Ionized Calcium:No results for input(s): IONCA in the last 72 hours. S. Magnesium:No results for input(s): MG in the last 72 hours. S. Phosphorus:No results for input(s): PHOS in the last 72 hours. S. Glucose:No results for input(s): POCGLU in the last 72 hours. Glycosylated hemoglobin A1C: No results for input(s): LABA1C in the last 72 hours. Pulmonary Functions Testing Results:    4/10/18  The flow volume loop is restrictive. FEV1 63% predicated, FVC 60%  predicated.   There was a 11% bronchodilator change in FEV1.  FEV1/FVC ratio is 79, post bronchodilator 81. Lung volumes by body box show TLC 85% predicted. % predicted. Diffusion capacity is 62% predicated. Airway resistance normal.    DATE: 10/11/2016     INTERPRETATION:  Flow volume loop shows a restrictive defect. FEV1 is 67% of predicted. FVC is 62% of predicted. Ratio of FEV1 to FVC is 81. Lung volumes by body  box total lung capacity 80% of predicted, RV is 121% of predicted,  diffusion capacity is 71% of predicted. No results found for: FEV1, FVC, DOY6GSA, TLC, DLCO    Blood Gases: No results found for: PH, PCO2, PO2, HCO3, O2SAT    Radiological reports:    CXR      CT Scans    0/10/19  Stable 6 mm solid noncalcified nodule in the posterior segment right upper   lobe.        04/10/18  1. Stable 6 mm noncalcified pulmonary nodule right upper lobe   2. Stable prominent left hilar lymph node and enlarged pretracheal lymph node          5/4/17  1. Stable benign 6 mm pulmonary nodule right upper lobe   2. Stable prominent mediastinal lymph nodes       4/25/2016                                      1. 6 mm noncalcified pulmonary nodule right upper lobe   2. Prominent left hilar lymph node and enlarged pretracheal lymph node     I have reviewed the Ct chest and R paratracheal LN is small and likley benign and L Hilar LN difficult to evaluate without contrast      EKG:   ECHO:     Compliance data seen from 10/15/17 to 11/13/17 30 days  97% compliance on auto cpap 16/ 6 cm AHI 22 Central apnea 8/hr min  average use 6H and 4 min       Compliance data seen from march to April 2019 for 30 days  100% compliance on cpap 15 cm AHI 12 and average use 6H and 18      Assessment:    ICD-10-CM    1. Chronic obstructive pulmonary disease, unspecified COPD type ([facility] [address] J44.9    2. WAN on CPAP G47.33     Z99.89    3.  Lung nodule R91.1      CT CHEST FROM FOR FOLLOW UP SEEN FOR RUL LUNG NODULE AND MEDIASTINAL ADENOPATHY SEEN AND NO CHANGE IN R PARATRACHEAL LN AND NO CHANGE IN RUL LUNG NODULE AS COMPARE TO CT IN 2016 AND IN MAY 2017    PLAN:    LUNG NODULE STABLE FOR 2 YEARS   ANNUAL CT SCAN FOR SCREENING, NEXT CT SCAN IN APRIL 2020  SMOKING CESSATION RECOMMENDED AND DISCUSSED TODAY  PFT'S SEEN FROM 2016 AND ALSO FROM April 2019 AND ALMOST STABLE FINDINGS ON PFTS  CONTINUE INCRUSE ONCE DAILY  CONTINUE ALBUTEROL AS NEEDED  CONTINUE REQUIP AT NIGHT  FLU VACCINE ANNUALLY  WILL NEED BOOSTER OF PNEUMOVAX AND NEXT YEAR PREVNAR 13, HE GETS VACCINE FROM PCP    INCREASE CPAP PRESSURE TO 16 Cm AS COMPLIANCE DATA SHOWED AHI  OF 12 ON CPAP  PRESSURE OF 15 CM BUT HE IS ON METHADONE AND MOY HAVE CENTRAL APNEA NOT RESPONDING TO CPAP AND MOY WILL NEED BIPAP OR BIPAP/ST   CONTINUE CPAP AT LEAST 4 HRS QHS  WT LOSS IS RECOMMENDED AND DISCUSSED  FOLLOW GOOD SLEEP HYGEINE INSTRUCTIONS  USE HUMIDIFIER   Rx FOR CPAP SUPPLIES  QUESTIONS ANSWERED PERTAINING TO DIAGNOSIS AND MANAGEMENT  EXPLAINED IMPORTANCE OF COMPLIANCE WITH THERAPY  PLEASE GET COMPLIANCE DATA BEFORE NEXT VISIT    RTC IN 6  MONTHS      Boni Hussein MD             4/25/2019, 12:06 PM        Social History     Tobacco Use   Smoking Status Current Every Day Smoker    Packs/day: 1.00    Years: 40.00    Pack years: 40.00   Smokeless Tobacco Never Used           Low Dose CT (LDCT) Lung Screening criteria met   Age 50-69   Pack year smoking >30   Still smoking or less than 15 year since quit   No sign or symptoms of lung cancer   > 11 months since last LDCT     Risks and benefits of lung cancer screening with LDCT scans discussed:    Significance of positive screen - False-positive LDCT results often occur. 95% of all positive results do not lead to a diagnosis of cancer. Usually further imaging can resolve most false-positive results; however, some patients may require invasive procedures.     Over diagnosis risk - 10% to 12% of screen-detected lung cancer cases are over diagnosed--that is, the cancer would not have been detected in the patient's lifetime without the screening. Need for follow up screens annually to continue lung cancer screening effectiveness     Risks associated with radiation from annual LDCT- Radiation exposure is about the same as for a mammogram, which is about 1/3 of the annual background radiation exposure from everyday life. Starting screening at age 54 is not likely to increase cancer risk from radiation exposure. Patients with comorbidities resulting in life expectancy of < 10 years, or that would preclude treatment of an abnormality identified on CT, should not be screened due to lack of benefit.     To obtain maximal benefit from this screening, smoking cessation and long-term abstinence from smoking is critical

## 2019-09-18 ENCOUNTER — OFFICE VISIT (OUTPATIENT)
Dept: UROLOGY | Age: 69
End: 2019-09-18
Payer: MEDICARE

## 2019-09-18 VITALS
BODY MASS INDEX: 35.84 KG/M2 | DIASTOLIC BLOOD PRESSURE: 90 MMHG | HEIGHT: 71 IN | WEIGHT: 256 LBS | SYSTOLIC BLOOD PRESSURE: 140 MMHG

## 2019-09-18 DIAGNOSIS — N40.2 PROSTATE NODULE: Primary | ICD-10-CM

## 2019-09-18 PROCEDURE — 1123F ACP DISCUSS/DSCN MKR DOCD: CPT | Performed by: UROLOGY

## 2019-09-18 PROCEDURE — 3017F COLORECTAL CA SCREEN DOC REV: CPT | Performed by: UROLOGY

## 2019-09-18 PROCEDURE — G8427 DOCREV CUR MEDS BY ELIG CLIN: HCPCS | Performed by: UROLOGY

## 2019-09-18 PROCEDURE — 4004F PT TOBACCO SCREEN RCVD TLK: CPT | Performed by: UROLOGY

## 2019-09-18 PROCEDURE — G8417 CALC BMI ABV UP PARAM F/U: HCPCS | Performed by: UROLOGY

## 2019-09-18 PROCEDURE — 99213 OFFICE O/P EST LOW 20 MIN: CPT | Performed by: UROLOGY

## 2019-09-18 PROCEDURE — 4040F PNEUMOC VAC/ADMIN/RCVD: CPT | Performed by: UROLOGY

## 2019-09-18 ASSESSMENT — ENCOUNTER SYMPTOMS
COLOR CHANGE: 0
ABDOMINAL PAIN: 0
COUGH: 0
WHEEZING: 0
NAUSEA: 0
VOMITING: 0
EYE PAIN: 0
SHORTNESS OF BREATH: 0
BACK PAIN: 0
EYE REDNESS: 0

## 2019-10-31 ENCOUNTER — OFFICE VISIT (OUTPATIENT)
Dept: PULMONOLOGY | Age: 69
End: 2019-10-31
Payer: MEDICARE

## 2019-10-31 VITALS
SYSTOLIC BLOOD PRESSURE: 163 MMHG | HEIGHT: 71 IN | WEIGHT: 264 LBS | TEMPERATURE: 98.3 F | DIASTOLIC BLOOD PRESSURE: 78 MMHG | OXYGEN SATURATION: 93 % | RESPIRATION RATE: 16 BRPM | BODY MASS INDEX: 36.96 KG/M2 | HEART RATE: 65 BPM

## 2019-10-31 DIAGNOSIS — R91.1 LUNG NODULE: ICD-10-CM

## 2019-10-31 DIAGNOSIS — G47.31 COMPLEX SLEEP APNEA SYNDROME: ICD-10-CM

## 2019-10-31 DIAGNOSIS — Z99.89 OSA ON CPAP: ICD-10-CM

## 2019-10-31 DIAGNOSIS — Z87.891 PERSONAL HISTORY OF TOBACCO USE: ICD-10-CM

## 2019-10-31 DIAGNOSIS — J44.9 CHRONIC OBSTRUCTIVE PULMONARY DISEASE, UNSPECIFIED COPD TYPE (HCC): Primary | ICD-10-CM

## 2019-10-31 DIAGNOSIS — G47.33 OSA ON CPAP: ICD-10-CM

## 2019-10-31 PROCEDURE — G8417 CALC BMI ABV UP PARAM F/U: HCPCS | Performed by: INTERNAL MEDICINE

## 2019-10-31 PROCEDURE — G8484 FLU IMMUNIZE NO ADMIN: HCPCS | Performed by: INTERNAL MEDICINE

## 2019-10-31 PROCEDURE — 1123F ACP DISCUSS/DSCN MKR DOCD: CPT | Performed by: INTERNAL MEDICINE

## 2019-10-31 PROCEDURE — 3023F SPIROM DOC REV: CPT | Performed by: INTERNAL MEDICINE

## 2019-10-31 PROCEDURE — G0296 VISIT TO DETERM LDCT ELIG: HCPCS | Performed by: INTERNAL MEDICINE

## 2019-10-31 PROCEDURE — 4004F PT TOBACCO SCREEN RCVD TLK: CPT | Performed by: INTERNAL MEDICINE

## 2019-10-31 PROCEDURE — 3017F COLORECTAL CA SCREEN DOC REV: CPT | Performed by: INTERNAL MEDICINE

## 2019-10-31 PROCEDURE — G8926 SPIRO NO PERF OR DOC: HCPCS | Performed by: INTERNAL MEDICINE

## 2019-10-31 PROCEDURE — 99214 OFFICE O/P EST MOD 30 MIN: CPT | Performed by: INTERNAL MEDICINE

## 2019-10-31 PROCEDURE — G8427 DOCREV CUR MEDS BY ELIG CLIN: HCPCS | Performed by: INTERNAL MEDICINE

## 2019-10-31 PROCEDURE — 4040F PNEUMOC VAC/ADMIN/RCVD: CPT | Performed by: INTERNAL MEDICINE

## 2019-12-05 ENCOUNTER — HOSPITAL ENCOUNTER (OUTPATIENT)
Dept: SLEEP CENTER | Age: 69
Discharge: HOME | End: 2019-12-07
Payer: MEDICARE

## 2019-12-05 DIAGNOSIS — G47.31 COMPLEX SLEEP APNEA SYNDROME: ICD-10-CM

## 2019-12-05 PROCEDURE — 95811 POLYSOM 6/>YRS CPAP 4/> PARM: CPT

## 2019-12-19 LAB — STATUS: NORMAL

## 2020-02-20 ENCOUNTER — TELEPHONE (OUTPATIENT)
Dept: PULMONOLOGY | Age: 70
End: 2020-02-20

## 2020-02-20 ENCOUNTER — OFFICE VISIT (OUTPATIENT)
Dept: PULMONOLOGY | Age: 70
End: 2020-02-20
Payer: MEDICARE

## 2020-02-20 VITALS
HEART RATE: 59 BPM | DIASTOLIC BLOOD PRESSURE: 76 MMHG | TEMPERATURE: 97.2 F | OXYGEN SATURATION: 95 % | SYSTOLIC BLOOD PRESSURE: 152 MMHG | HEIGHT: 71 IN | RESPIRATION RATE: 16 BRPM | BODY MASS INDEX: 37.24 KG/M2 | WEIGHT: 266 LBS

## 2020-02-20 PROCEDURE — 4040F PNEUMOC VAC/ADMIN/RCVD: CPT | Performed by: INTERNAL MEDICINE

## 2020-02-20 PROCEDURE — G8484 FLU IMMUNIZE NO ADMIN: HCPCS | Performed by: INTERNAL MEDICINE

## 2020-02-20 PROCEDURE — 99214 OFFICE O/P EST MOD 30 MIN: CPT | Performed by: INTERNAL MEDICINE

## 2020-02-20 PROCEDURE — 99214 OFFICE O/P EST MOD 30 MIN: CPT

## 2020-02-20 PROCEDURE — 4004F PT TOBACCO SCREEN RCVD TLK: CPT | Performed by: INTERNAL MEDICINE

## 2020-02-20 PROCEDURE — 3017F COLORECTAL CA SCREEN DOC REV: CPT | Performed by: INTERNAL MEDICINE

## 2020-02-20 PROCEDURE — 3023F SPIROM DOC REV: CPT | Performed by: INTERNAL MEDICINE

## 2020-02-20 PROCEDURE — G8417 CALC BMI ABV UP PARAM F/U: HCPCS | Performed by: INTERNAL MEDICINE

## 2020-02-20 PROCEDURE — 1123F ACP DISCUSS/DSCN MKR DOCD: CPT | Performed by: INTERNAL MEDICINE

## 2020-02-20 PROCEDURE — G8427 DOCREV CUR MEDS BY ELIG CLIN: HCPCS | Performed by: INTERNAL MEDICINE

## 2020-02-20 PROCEDURE — G8926 SPIRO NO PERF OR DOC: HCPCS | Performed by: INTERNAL MEDICINE

## 2020-02-20 NOTE — PATIENT INSTRUCTIONS
SURVEY:    You may be receiving a survey from [employer] regarding your visit today. Please complete the survey to enable us to provide the highest quality of care to you and your family. If you cannot score us a very good on any question, please call the office to discuss how we could have made your experience a very good one. Thank you. Please recheck your blood pressure when you go home and make sure you take your prescribed medication if applicable . Please follow up with your PCP or ER if needed. Patient Education        Learning About Sleeping Well  What does sleeping well mean? Sleeping well means getting enough sleep. How much sleep is enough varies among people. The number of hours you sleep is not as important as how you feel when you wake up. If you do not feel refreshed, you probably need more sleep. Another sign of not getting enough sleep is feeling tired during the day. The average total nightly sleep time is 7½ to 8 hours. Healthy adults may need a little more or a little less than this. Why is getting enough sleep important? Getting enough quality sleep is a basic part of good health. When your sleep suffers, your mood and your thoughts can suffer too. You may find yourself feeling more grumpy or stressed. Not getting enough sleep also can lead to serious problems, including injury, accidents, anxiety, and depression. What might cause poor sleeping? Many things can cause sleep problems, including:  · Stress. Stress can be caused by fear about a single event, such as giving a speech. Or you may have ongoing stress, such as worry about work or school. · Depression, anxiety, and other mental or emotional conditions. · Changes in your sleep habits or surroundings. This includes changes that happen where you sleep, such as noise, light, or sleeping in a different bed. It also includes changes in your sleep pattern, such as having jet lag or working a late shift.   · Health

## 2020-02-20 NOTE — TELEPHONE ENCOUNTER
Pt phoned to state that he can NOT afford the $300.00+ / mo cost of the Incruse Ellipta that was prescribed. Writer phoned pt to ask if he'd been given any other options per the pharmacy, but he stated \"No\" to this question. Please advise. Thank you.

## 2020-02-20 NOTE — PROGRESS NOTES
PULMONARY OP  PROGRESS NOTE      Patient:  Valentina Carney  YOB: 1950    MRN: [id number]     Acct:        Pt seen and Chart reviewed. Mr/Ms Valentina Carney is here in followup for      Diagnosis Orders   1. Complex sleep apnea syndrome     2. Obstructive sleep apnea     3. Chronic obstructive pulmonary disease, unspecified COPD type (HCC)     4. Lung nodule     5. Smoking greater than 40 pack years     6. RLS (restless legs syndrome)        Here for follow up for WAN, COPD, restrictive lung disease, obesity, tobacco dependence, RLS and lung nodule    When he was seen last time he was on CPAP and likely have complex sleep apnea, obstructive sleep apnea with central apnea likely secondary to chronic methadone use and he was 20 or more on CPAP and recommended to have a BiPAP backup rate and was scheduled for a BiPAP titration study. He had a re-titration study done in December he was initially tried on CPAP but apneas were not able to control on CPAP and he was started on BiPAP during the titration study he was titrated to 25/21 during the study. He was started on BiPAP almost a month ago, since he was started on BiPAP he thinks that he is feeling better sleep is and he is able to sleep better, he is energetic during the daytime, sometimes he dose of at night watching TV before he goes to sleep. He thinks that he is sleeping 5 to 6 hours at night. Compliance data is available from BiPAP in last 30 days and his AHI is 4.5 now which is much better than when he was on CPAP. He denies chronic cough, wheezing chest tightness. He has chronic shortness of breath on activities and exertion he claimed that he walks but denies dyspnea on exertion. He denies waking up at night with cough wheezing chest tightness or shortness of breath he denies any increasing pedal edema orthopnea or PND.   He is not using Incruse which he supposed to use it once a day and he has occasional use of albuterol. He still smoking cigarettes about 1 pack/day. His symptoms of restless leg syndrome is controlled on current Requip and he denies waking up at night with restless feeling in his legs or before he goes to sleep. He has a stable lung nodule for 2 years and he is scheduled for low-dose screening/annual CT scan in April 2020 which was requested on last visit    His last CT scan was done in April 2019 and which showed stable  right upper lobe lung nodule and stable paratracheal lymph node. Sleep Questionnaire  Positive dry mouth upon awakening. Negative fatigue and tiredness during the day. Goes to sleep at 1am wakes up 7 am. It takes few minutes to fall asleep. Takes no nap during the day. No headache in am. No car wrecks or near wrecks because of the sleepiness. No nodding off while driving. No weight gain. No forgetfulness or decreased concentration. No nasal congestion or obstruction at night. Using BIPAP 5-6 hr/night. No leg jerks during sleep. No restless feelings in legs at night ( takes requip)t. No numbness or burning in leg or feet. No leg aches or cramps . No loss of muscle strength when angry or laugh. No hallucination when dozing off or waking up from sleep. No paralysis upon awakening from sleep or going to sleep. No teeth grinding, nightmares, sleep walking. No night time panic attacks. Initially  He was on auto cpap and recently data available from cpap showed his AHI was still 22 and CA 8/ hr also, he is on methadone,  and was on auto cpap with P min of 6 and P max of 16 cm.       ESS is 2    ESS:   Sitting and reading 1  Watching TV 1  Sitting inactive in a public place (e.g a theater or a meeting) 0  As a passenger in a car for an hour without a break 0  Lying down to rest in the afternoon when circumstances permit 0  Sitting and talking to someone 0  Sitting quietly after a lunch without alcohol 0  In a car, while stopped for a few minutes in traffic Take 100 mg by mouth daily. , Disp: , Rfl:     nitroGLYCERIN (NITROSTAT) 0.4 MG SL tablet, Place 0.4 mg under the tongue every 5 minutes as needed. , Disp: , Rfl:     ropinirole (REQUIP) 1 MG tablet, Take 1 mg by mouth nightly.  , Disp: , Rfl:     methadone (DOLOPHINE) 5 MG tablet, Take 5 mg by mouth 2 times daily. , Disp: , Rfl:       Objective:    Physical Exam:  Vitals:   BP (!) 152/76   Pulse 59   Temp 97.2 °F (36.2 °C)   Resp 16   Ht 5' 11\" (1.803 m)   Wt 266 lb (120.7 kg)   SpO2 95%   BMI 37.10 kg/m²   Last 3 weights: Wt Readings from Last 3 Encounters:   02/20/20 266 lb (120.7 kg)   10/31/19 264 lb (119.7 kg)   09/18/19 256 lb (116.1 kg)     Body mass index is 37.1 kg/m². Physical Examination:   General appearance - alert, well appearing, and in no distress  Mental status - alert, oriented to person, place, and time  Eyes - pupils equal and reactive, extraocular eye movements intact  Nose - normal and patent, no erythema, discharge or polyps  Mouth - mucous membranes moist, Small oropharynx, large tongue, Mallampati 2  Neck - supple, no significant adenopathy, thick and short  Chest - No distress, retractions or cyanosis, symmetrical chest movement and clear to auscultation, no wheezes, rales or rhonchi, symmetric air entry  Heart - normal rate, regular rhythm, normal S1, S2, no murmurs, rubs, clicks or gallops  Abdomen - soft, nontender, nondistended, no masses or organomegaly  Neurological - alert, oriented, normal speech, no focal findings or movement disorder noted  Extremities - peripheral pulses normal, no pedal edema, no clubbing or cyanosis  Skin - normal coloration and turgor, no rashes, no suspicious skin lesions noted     Labs:  None    CBC: No results for input(s): WBC, HGB, PLT in the last 72 hours. BMP:  No results for input(s): NA, K, CL, CO2, BUN, CREATININE, GLUCOSE in the last 72 hours. S. Calcium:No results for input(s): CALCIUM in the last 72 hours.   S. Ionized Calcium:No results for input(s): IONCA in the last 72 hours. S. Magnesium:No results for input(s): MG in the last 72 hours. S. Phosphorus:No results for input(s): PHOS in the last 72 hours. S. Glucose:No results for input(s): POCGLU in the last 72 hours. Glycosylated hemoglobin A1C: No results for input(s): LABA1C in the last 72 hours. Pulmonary Functions Testing Results:    4/10/18  The flow volume loop is restrictive. FEV1 63% predicated, FVC 60%  predicated. There was a 11% bronchodilator change in FEV1.  FEV1/FVC ratio is 79, post bronchodilator 81. Lung volumes by body box show TLC 85% predicted. % predicted. Diffusion capacity is 62% predicated. Airway resistance normal.    DATE: 10/11/2016     INTERPRETATION:  Flow volume loop shows a restrictive defect. FEV1 is 67% of predicted. FVC is 62% of predicted. Ratio of FEV1 to FVC is 81. Lung volumes by body  box total lung capacity 80% of predicted, RV is 121% of predicted,  diffusion capacity is 71% of predicted. No results found for: FEV1, FVC, DHN2SNY, TLC, DLCO    Blood Gases: No results found for: PH, PCO2, PO2, HCO3, O2SAT    Radiological reports:    CXR      CT Scans    04/10/19  Stable 6 mm solid noncalcified nodule in the posterior segment right upper   lobe.        04/10/18  1. Stable 6 mm noncalcified pulmonary nodule right upper lobe   2. Stable prominent left hilar lymph node and enlarged pretracheal lymph node          5/4/17  1. Stable benign 6 mm pulmonary nodule right upper lobe   2. Stable prominent mediastinal lymph nodes       4/25/2016                                      1. 6 mm noncalcified pulmonary nodule right upper lobe   2.  Prominent left hilar lymph node and enlarged pretracheal lymph node     I have reviewed the Ct chest and R paratracheal LN is small and likley benign and L Hilar LN difficult to evaluate without contrast      EKG:   ECHO:     Compliance data seen from 10/15/17 to 11/13/17 30 days  97% compliance on auto

## 2020-03-04 NOTE — TELEPHONE ENCOUNTER
Pt was called and was instructed to contact his insurance company regarding possible alternatives to the [employer] which the pt states he can not afford. Per CATHIE oRd's recommendation, offerings to have pt inquire for the following were made to be as:    [employer] Chung, [employer], [employer] and [employer]. Pt will re-contact this office with the information he is given after his call to his insurance.

## 2020-03-13 LAB — PROSTATE SPECIFIC ANTIGEN: 1.68 NG/ML

## 2020-03-17 RX ORDER — IPRATROPIUM BROMIDE AND ALBUTEROL SULFATE 2.5; .5 MG/3ML; MG/3ML
1 SOLUTION RESPIRATORY (INHALATION) 3 TIMES DAILY
Qty: 120 ML | Refills: 5 | Status: SHIPPED | OUTPATIENT
Start: 2020-03-17 | End: 2020-08-20 | Stop reason: SDUPTHER

## 2020-03-17 NOTE — TELEPHONE ENCOUNTER
Insurance will only cover [employer] and he needs an order for a nebulizing machine and his DME is [facility]. Please advise.

## 2020-03-18 ENCOUNTER — OFFICE VISIT (OUTPATIENT)
Dept: UROLOGY | Age: 70
End: 2020-03-18
Payer: MEDICARE

## 2020-03-18 VITALS — BODY MASS INDEX: 36.96 KG/M2 | SYSTOLIC BLOOD PRESSURE: 142 MMHG | WEIGHT: 265 LBS | DIASTOLIC BLOOD PRESSURE: 78 MMHG

## 2020-03-18 PROCEDURE — 4004F PT TOBACCO SCREEN RCVD TLK: CPT | Performed by: UROLOGY

## 2020-03-18 PROCEDURE — 3017F COLORECTAL CA SCREEN DOC REV: CPT | Performed by: UROLOGY

## 2020-03-18 PROCEDURE — 4040F PNEUMOC VAC/ADMIN/RCVD: CPT | Performed by: UROLOGY

## 2020-03-18 PROCEDURE — 1123F ACP DISCUSS/DSCN MKR DOCD: CPT | Performed by: UROLOGY

## 2020-03-18 PROCEDURE — G8417 CALC BMI ABV UP PARAM F/U: HCPCS | Performed by: UROLOGY

## 2020-03-18 PROCEDURE — G8484 FLU IMMUNIZE NO ADMIN: HCPCS | Performed by: UROLOGY

## 2020-03-18 PROCEDURE — 99213 OFFICE O/P EST LOW 20 MIN: CPT | Performed by: UROLOGY

## 2020-03-18 PROCEDURE — G8427 DOCREV CUR MEDS BY ELIG CLIN: HCPCS | Performed by: UROLOGY

## 2020-03-18 PROCEDURE — 99211 OFF/OP EST MAY X REQ PHY/QHP: CPT | Performed by: UROLOGY

## 2020-03-18 ASSESSMENT — ENCOUNTER SYMPTOMS
WHEEZING: 0
COLOR CHANGE: 0
ABDOMINAL PAIN: 0
VOMITING: 0
EYE PAIN: 0
SHORTNESS OF BREATH: 0
EYE REDNESS: 0
NAUSEA: 0
COUGH: 0
BACK PAIN: 0

## 2020-05-20 ENCOUNTER — TELEPHONE (OUTPATIENT)
Dept: ONCOLOGY | Age: 70
End: 2020-05-20

## 2020-06-16 ENCOUNTER — HOSPITAL ENCOUNTER (OUTPATIENT)
Dept: CT IMAGING | Age: 70
Discharge: HOME | End: 2020-06-18
Payer: MEDICARE

## 2020-06-16 PROCEDURE — G0297 LDCT FOR LUNG CA SCREEN: HCPCS

## 2020-08-20 ENCOUNTER — OFFICE VISIT (OUTPATIENT)
Dept: PULMONOLOGY | Age: 70
End: 2020-08-20
Payer: MEDICARE

## 2020-08-20 VITALS
BODY MASS INDEX: 36.57 KG/M2 | HEIGHT: 72 IN | HEART RATE: 61 BPM | OXYGEN SATURATION: 93 % | DIASTOLIC BLOOD PRESSURE: 81 MMHG | RESPIRATION RATE: 16 BRPM | TEMPERATURE: 98.1 F | SYSTOLIC BLOOD PRESSURE: 161 MMHG | WEIGHT: 270 LBS

## 2020-08-20 PROCEDURE — G8926 SPIRO NO PERF OR DOC: HCPCS | Performed by: INTERNAL MEDICINE

## 2020-08-20 PROCEDURE — 3017F COLORECTAL CA SCREEN DOC REV: CPT | Performed by: INTERNAL MEDICINE

## 2020-08-20 PROCEDURE — G8417 CALC BMI ABV UP PARAM F/U: HCPCS | Performed by: INTERNAL MEDICINE

## 2020-08-20 PROCEDURE — 4040F PNEUMOC VAC/ADMIN/RCVD: CPT | Performed by: INTERNAL MEDICINE

## 2020-08-20 PROCEDURE — 4004F PT TOBACCO SCREEN RCVD TLK: CPT | Performed by: INTERNAL MEDICINE

## 2020-08-20 PROCEDURE — 1123F ACP DISCUSS/DSCN MKR DOCD: CPT | Performed by: INTERNAL MEDICINE

## 2020-08-20 PROCEDURE — 3023F SPIROM DOC REV: CPT | Performed by: INTERNAL MEDICINE

## 2020-08-20 PROCEDURE — G8427 DOCREV CUR MEDS BY ELIG CLIN: HCPCS | Performed by: INTERNAL MEDICINE

## 2020-08-20 PROCEDURE — 99214 OFFICE O/P EST MOD 30 MIN: CPT

## 2020-08-20 PROCEDURE — 99214 OFFICE O/P EST MOD 30 MIN: CPT | Performed by: INTERNAL MEDICINE

## 2020-08-20 RX ORDER — IPRATROPIUM BROMIDE AND ALBUTEROL SULFATE 2.5; .5 MG/3ML; MG/3ML
1 SOLUTION RESPIRATORY (INHALATION) 3 TIMES DAILY
Qty: 120 ML | Refills: 5 | Status: SHIPPED | OUTPATIENT
Start: 2020-08-20 | End: 2020-08-20 | Stop reason: CLARIF

## 2020-08-20 RX ORDER — IPRATROPIUM BROMIDE AND ALBUTEROL SULFATE 2.5; .5 MG/3ML; MG/3ML
1 SOLUTION RESPIRATORY (INHALATION) 3 TIMES DAILY
Qty: 360 ML | Refills: 11 | Status: SHIPPED | OUTPATIENT
Start: 2020-08-20

## 2020-08-20 NOTE — PATIENT INSTRUCTIONS
cough or sneeze. Use soap and water, and scrub for at least 20 seconds. If soap and water aren't available, use an alcohol-based hand . · Avoid touching your mouth, nose, and eyes. What can you do to avoid spreading the virus to others? To help avoid spreading the virus to others:  · Cover your mouth with a tissue when you cough or sneeze. Then throw the tissue in the trash. · Use a disinfectant to clean things that you touch often. · Wear a cloth face cover if you have to go to public areas. · Stay home if you are sick or have been exposed to the virus. Don't go to school, work, or public areas. And don't use public transportation, ride-shares, or taxis unless you have no choice. · If you are sick:  ? Leave your home only if you need to get medical care. But call the doctor's office first so they know you're coming. And wear a face cover. ? Wear the face cover whenever you're around other people. It can help stop the spread of the virus when you cough or sneeze. ? Clean and disinfect your home every day. Use household  and disinfectant wipes or sprays. Take special care to clean things that you grab with your hands. These include doorknobs, remote controls, phones, and handles on your refrigerator and microwave. And don't forget countertops, tabletops, bathrooms, and computer keyboards. When to call for help  [id number] anytime you think you may need emergency care. For example, call if:  · You have severe trouble breathing. (You can't talk at all.)  · You have constant chest pain or pressure. · You are severely dizzy or lightheaded. · You are confused or can't think clearly. · Your face and lips have a blue color. · You pass out (lose consciousness) or are very hard to wake up. Call your doctor now if you develop symptoms such as:  · Shortness of breath. · Fever. · Cough. If you need to get care, call ahead to the doctor's office for instructions before you go.  Make sure you wear a face cover to prevent exposing other people to the virus. Where can you get the latest information? The following health organizations are tracking and studying this virus. Their websites contain the most up-to-date information. Gigi Cristhian also learn what to do if you think you may have been exposed to the virus. · U.S. Centers for Disease Control and Prevention (CDC): The CDC provides updated news about the disease and travel advice. The website also tells you how to prevent the spread of infection. www.cdc.gov  · World Health Organization [facility]): WHO offers information about the virus outbreaks. WHO also has travel advice. www.who.int  Current as of: May 8, 2020               Content Version: 12.5  © 2006-2020 Healthwise, Incorporated. Care instructions adapted under license by [facility] ([facility]). If you have questions about a medical condition or this instruction, always ask your healthcare professional. [address] any warranty or liability for your use of this information.

## 2020-08-20 NOTE — PROGRESS NOTES
gel, Apply topically daily Apply topically daily. , Disp: , Rfl:     gabapentin (NEURONTIN) 800 MG tablet, Take 800 mg by mouth 3 times daily, Disp: , Rfl:     Albuterol Sulfate (VENTOLIN HFA IN), Inhale 90 mcg into the lungs 2 times daily. , Disp: , Rfl:     imipramine (TOFRANIL) 25 MG tablet, Take 25 mg by mouth nightly.  , Disp: , Rfl:     aspirin 81 MG EC tablet, Take 81 mg by mouth daily. , Disp: , Rfl:     atorvastatin (LIPITOR) 10 MG tablet, Take 30 mg by mouth daily. , Disp: , Rfl:     metoprolol (TOPROL-XL) 50 MG XL tablet, Take 100 mg by mouth daily. , Disp: , Rfl:     nitroGLYCERIN (NITROSTAT) 0.4 MG SL tablet, Place 0.4 mg under the tongue every 5 minutes as needed. , Disp: , Rfl:     ropinirole (REQUIP) 1 MG tablet, Take 1 mg by mouth nightly.  , Disp: , Rfl:     methadone (DOLOPHINE) 5 MG tablet, Take 5 mg by mouth 2 times daily. , Disp: , Rfl:       Objective:    Physical Exam:  Vitals:   BP (!) 161/81   Pulse 61   Temp 98.1 °F (36.7 °C)   Resp 16   Ht 6' (1.829 m)   Wt 270 lb (122.5 kg)   SpO2 93%   BMI 36.62 kg/m²   Last 3 weights: Wt Readings from Last 3 Encounters:   08/20/20 270 lb (122.5 kg)   03/18/20 265 lb (120.2 kg)   02/20/20 266 lb (120.7 kg)     Body mass index is 36.62 kg/m². Physical Examination:   General appearance - alert, well appearing, and in no distress  Mental status - alert, oriented to person, place, and time  Eyes - pupils equal and reactive, extraocular eye movements intact  Nose - normal and patent, no erythema, discharge or polyps  Mouth - mucous membranes moist, Small oropharynx, large tongue, Mallampati 2  Neck - supple, no significant adenopathy, thick and short  Chest - No distress, retractions or cyanosis, bilateral symmetrical chest movement, air entry is bilateral without expiratory wheezing rhonchi or crackles.     Heart - normal rate, regular rhythm, normal S1, S2, no murmurs, rubs, clicks or gallops  Abdomen - soft, nontender, nondistended, no masses or organomegaly  Neurological - alert, oriented, normal speech, no focal findings or movement disorder noted  Extremities - peripheral pulses normal, no pedal edema, no clubbing or cyanosis  Skin - normal coloration and turgor, no rashes, no suspicious skin lesions noted     Labs:  None    CBC: No results for input(s): WBC, HGB, PLT in the last 72 hours. BMP:  No results for input(s): NA, K, CL, CO2, BUN, CREATININE, GLUCOSE in the last 72 hours. S. Calcium:No results for input(s): CALCIUM in the last 72 hours. S. Ionized Calcium:No results for input(s): IONCA in the last 72 hours. S. Magnesium:No results for input(s): MG in the last 72 hours. S. Phosphorus:No results for input(s): PHOS in the last 72 hours. S. Glucose:No results for input(s): POCGLU in the last 72 hours. Glycosylated hemoglobin A1C: No results for input(s): LABA1C in the last 72 hours. Pulmonary Functions Testing Results:    4/10/18  The flow volume loop is restrictive. FEV1 63% predicated, FVC 60%  predicated. There was a 11% bronchodilator change in FEV1.  FEV1/FVC ratio is 79, post bronchodilator 81. Lung volumes by body box show TLC 85% predicted. % predicted. Diffusion capacity is 62% predicated. Airway resistance normal.    DATE: 10/11/2016     INTERPRETATION:  Flow volume loop shows a restrictive defect. FEV1 is 67% of predicted. FVC is 62% of predicted. Ratio of FEV1 to FVC is 81. Lung volumes by body  box total lung capacity 80% of predicted, RV is 121% of predicted,  diffusion capacity is 71% of predicted.   No results found for: FEV1, FVC, IOM7EUR, TLC, DLCO    Blood Gases: No results found for: PH, PCO2, PO2, HCO3, O2SAT    Radiological reports:    CXR      CT Scans  06/16/2020  Mediastinum:  Stable scattered mediastinal lymph nodes up to 13 mm in short    axis.  Largest is 13 mm in the low right paratracheal region.  Cardiac size    normal.  Normal caliber aorta.  Thyroid gland grossly normal.  Patulous    esophagus noted.         Lungs/Pleura:  Mild centrilobular emphysema.         6 mm solid noncalcified nodule posterior segment right upper lobe unchanged.         A 4 mm solid noncalcified nodule in the posterior basal right lower lobe on    series 3, image 94 is also unchanged.  No significant new nodules or masses. 04/10/19  Stable 6 mm solid noncalcified nodule in the posterior segment right upper   lobe.        04/10/18  1. Stable 6 mm noncalcified pulmonary nodule right upper lobe   2. Stable prominent left hilar lymph node and enlarged pretracheal lymph node          5/4/17  1. Stable benign 6 mm pulmonary nodule right upper lobe   2. Stable prominent mediastinal lymph nodes       4/25/2016                                      1. 6 mm noncalcified pulmonary nodule right upper lobe   2. Prominent left hilar lymph node and enlarged pretracheal lymph node     I have reviewed the Ct chest and R paratracheal LN is small and likley benign and L Hilar LN difficult to evaluate without contrast      EKG:   ECHO:     Compliance data seen from 10/15/17 to 11/13/17 30 days  97% compliance on auto cpap 16/ 6 cm AHI 22 Central apnea 8/hr min  average use 6H and 4 min       Compliance data seen from march to April 2019 for 30 days  100% compliance on cpap 15 cm AHI 12 and average use 6H and 18    Compliance data from September to October 2019 for 30 days. Compliance 100% on CPAP 16 cm, average AHI 12.6    Compliance data   07/20/2020 to 08/18/2020  Compliance 100%. Compliance for greater than 4-hour usage 90%. Average AHI 4.3. Average usage 6 hours 19 minutes  BiPAP pressure 25/21. Assessment:    ICD-10-CM    1. Complex sleep apnea syndrome  G47.31    2. Obesity (BMI 35.0-39.9 without comorbidity)  E66.9    3. Restless legs syndrome (RLS)  G25.81    4. Chronic obstructive pulmonary disease, unspecified COPD type ([facility] [address]  J44.9    5. Smoking greater than 40 pack years  F17.210    6.  Lung nodules R91.8      CT chest from June 2020 as compared to April 2019 and 2018 showed stable right upper lobe lung nodule and stable mediastinal adenopathy without much change in paratracheal lymph node from CT scan in 2016, May 2017 and April 2018    PLAN:  Lung nodule is stable for 2 years on CT scan. Annual low-dose screening CT scan and next would be in June 2021. Completed smoking cessation discussed again today. Pulmonary function test from 2016 and from April 2019 showed almost stable finding on pulmonary function test , will get PFTs on next visit  He is not able to afford Incruse. DuoNeb aerosol 3 times a day and as needed. Home nebulizer  Refill given  Albuterol to be used as needed. Continue Requip at night. Flu vaccine recommended annually in fall. He will need booster of Pneumovax next year and Prevnar 13, he get vaccines from Dr. Desean Allen office. Continue with BIPAP at 25/21. Continue BIPAP at least 4 hours nightly. Weight loss is recommended and discussed. Follow good sleep hygiene instruction given. Use humidifier. No prescription for BIPAP supplies needed at this time. Questions answered pertaining to diagnosis and management and explained importance of compliance with therapy. RTC in 6 months. Please note that this chart was generated using voice recognition Dragon dictation software. Although every effort was made to ensure the accuracy of this automated transcription, some errors in transcription may have occurred.       Audrey Boudreaux MD             8/20/2020, 9:25 AM

## 2020-09-15 LAB — PROSTATE SPECIFIC ANTIGEN: 1.58 NG/ML

## 2020-09-18 ENCOUNTER — OFFICE VISIT (OUTPATIENT)
Dept: UROLOGY | Age: 70
End: 2020-09-18
Payer: MEDICARE

## 2020-09-18 VITALS
DIASTOLIC BLOOD PRESSURE: 78 MMHG | BODY MASS INDEX: 36.54 KG/M2 | TEMPERATURE: 97.5 F | HEIGHT: 72 IN | WEIGHT: 269.8 LBS | SYSTOLIC BLOOD PRESSURE: 138 MMHG

## 2020-09-18 PROCEDURE — G8417 CALC BMI ABV UP PARAM F/U: HCPCS | Performed by: NURSE PRACTITIONER

## 2020-09-18 PROCEDURE — 99211 OFF/OP EST MAY X REQ PHY/QHP: CPT | Performed by: UROLOGY

## 2020-09-18 PROCEDURE — 99213 OFFICE O/P EST LOW 20 MIN: CPT | Performed by: NURSE PRACTITIONER

## 2020-09-18 PROCEDURE — 1036F TOBACCO NON-USER: CPT | Performed by: NURSE PRACTITIONER

## 2020-09-18 PROCEDURE — 4040F PNEUMOC VAC/ADMIN/RCVD: CPT | Performed by: NURSE PRACTITIONER

## 2020-09-18 PROCEDURE — G8427 DOCREV CUR MEDS BY ELIG CLIN: HCPCS | Performed by: NURSE PRACTITIONER

## 2020-09-18 PROCEDURE — 3017F COLORECTAL CA SCREEN DOC REV: CPT | Performed by: NURSE PRACTITIONER

## 2020-09-18 PROCEDURE — 1123F ACP DISCUSS/DSCN MKR DOCD: CPT | Performed by: NURSE PRACTITIONER

## 2020-09-18 RX ORDER — ATORVASTATIN CALCIUM 20 MG/1
20 TABLET, FILM COATED ORAL DAILY
COMMUNITY
Start: 2020-08-17

## 2020-09-18 RX ORDER — FUROSEMIDE 20 MG/1
20 TABLET ORAL DAILY
COMMUNITY
Start: 2020-06-17

## 2020-09-18 ASSESSMENT — ENCOUNTER SYMPTOMS
ABDOMINAL PAIN: 0
COUGH: 0
NAUSEA: 0
EYE PAIN: 0
SHORTNESS OF BREATH: 0
CONSTIPATION: 0
COLOR CHANGE: 0
BACK PAIN: 0
EYE REDNESS: 0
WHEEZING: 0
VOMITING: 0

## 2020-09-18 NOTE — PATIENT INSTRUCTIONS
SURVEY:    You may be receiving a survey from [employer] regarding your visit today. Please complete the survey to enable us to provide the highest quality of care to you and your family. If you cannot score us a very good on any question, please call the office to discuss how we could of made your experience a very good one. Thank you.

## 2020-09-18 NOTE — PROGRESS NOTES
HPI:    Patient is a 78 y.o. male in no acute distress. He is alert and oriented to person, place, and time. History  9/2018 Referred for abnormal MRI.  MRI of right hip showed \"decreased signal intensity superior lateral aspect prostate could be secondary to prostate neoplasm, further evaluation recommended. \" Patient does have one paternal uncle with prostate cancer.  This was diagnosed in his 76s.             PSA 1.37, TASHIA normal. No indications to proceed with biopsy. PSA  9/2020 - 1.58  3/2020 - 1.68  9/2019 - 1.82  3/2019 - 2.14  9/2018 - 1.37    Today  Here today to follow-up for history of abnormal rectal exam.  Most recent PSA is 1.58. This is stable. He denies unintentional weight loss, decreased energy or appetite, new or worsening bone/hip/back pain. He denies any lower extremity numbness and tingling. He denies new or worsening LUTS. He denies gross hematuria or dysuria.      Past Medical History:   Diagnosis Date    Abnormal glucose     Actinic keratosis     Anxiety disorder     Back pain     COPD (chronic obstructive pulmonary disease) ([facility])     Coronary stent     Esophageal reflux     Heart failure, systolic (HCC)     Hyperlipidemia     Hypertension     Hypertension, essential, benign     Lumbar strain     Osteoporosis     Seasonal allergic rhinitis     Unspecified sleep apnea      Past Surgical History:   Procedure Laterality Date    COLONOSCOPY      CORONARY ANGIOPLASTY WITH STENT PLACEMENT      NECK SURGERY      x2    TONSILLECTOMY       Outpatient Encounter Medications as of 9/18/2020   Medication Sig Dispense Refill    atorvastatin (LIPITOR) 20 MG tablet Take 20 mg by mouth daily      ipratropium-albuterol (DUONEB) 0.5-2.5 (3) MG/3ML SOLN nebulizer solution Inhale 3 mLs into the lungs three times daily 360 mL 11    acetaminophen (TYLENOL) 500 MG tablet Take 500 mg by mouth every 6 hours as needed for Pain      fluticasone (FLONASE) 50 MCG/ACT nasal spray 1 spray by daily Apply topically daily.  gabapentin (NEURONTIN) 800 MG tablet Take 800 mg by mouth 3 times daily      Albuterol Sulfate (VENTOLIN HFA IN) Inhale 90 mcg into the lungs 2 times daily.  imipramine (TOFRANIL) 25 MG tablet Take 25 mg by mouth nightly.  aspirin 81 MG EC tablet Take 81 mg by mouth daily.  metoprolol (TOPROL-XL) 50 MG XL tablet Take 100 mg by mouth daily.  nitroGLYCERIN (NITROSTAT) 0.4 MG SL tablet Place 0.4 mg under the tongue every 5 minutes as needed.  ropinirole (REQUIP) 1 MG tablet Take 1 mg by mouth nightly.  methadone (DOLOPHINE) 5 MG tablet Take 5 mg by mouth 2 times daily.  furosemide (LASIX) 20 MG tablet Take 20 mg by mouth daily       No current facility-administered medications on file prior to visit. Patient has no known allergies. Family History   Problem Relation Age of Onset    Kidney Disease Mother      Social History     Tobacco Use   Smoking Status Former Smoker    Packs/day: 1.00    Years: 40.00    Pack years: 40.00    Types: Cigarettes    Start date: 1964   Smokeless Tobacco Never Used       Social History     Substance and Sexual Activity   Alcohol Use No       Review of Systems   Constitutional: Negative for appetite change, chills and fever. Eyes: Negative for pain, redness and visual disturbance. Respiratory: Negative for cough, shortness of breath and wheezing. Cardiovascular: Negative for chest pain and leg swelling. Gastrointestinal: Negative for abdominal pain, constipation, nausea and vomiting. Genitourinary: Negative for decreased urine volume, difficulty urinating, discharge, dysuria, enuresis, flank pain, frequency, hematuria, penile pain, scrotal swelling, testicular pain and urgency. Musculoskeletal: Negative for back pain, joint swelling and myalgias. Skin: Negative for color change, rash and wound. Neurological: Negative for dizziness, tremors and numbness.    Hematological: Negative for adenopathy. Does not bruise/bleed easily. /78 (Site: Right Upper Arm, Position: Sitting, Cuff Size: Large Adult)   Temp 97.5 °F (36.4 °C) (Temporal)   Ht 6' (1.829 m)   Wt 269 lb 12.8 oz (122.4 kg)   BMI 36.59 kg/m²       PHYSICAL EXAM:  Constitutional: Patient in no acute distress; Neuro: alert and oriented to person place and time. Psych: Mood and affect normal.  Skin: Normal  Lungs: Respiratory effort normal  Cardiovascular:  Normal peripheral pulses  Abdomen: Soft, non-tender, non-distended with no CVA, flank pain, hepatosplenomegaly or hernia. Kidneys normal.  Bladder non-tender and not distended. Lymphatics: no palpable lymphadenopathy  Penis normal  Urethral meatus normal  Scrotal exam normal  Testicles normal bilaterally  Epididymis normal bilaterally  No evidence of inguinal hernia  Anus and perineum normal  Normal rectal tone with no masses  Prostate soft, non-tender to palpation. No palpable nodules. Estimated 30 grams. Seminal vesicles not palpable. No results found for: BUN  No results found for: CREATININE  Lab Results   Component Value Date    PSA 1.58 09/15/2020    PSA 1.68 03/13/2020    PSA 2.14 03/14/2019       ASSESSMENT:   Diagnosis Orders   1. Prostate nodule     2. Screening PSA (prostate specific antigen)  Psa screening           PLAN:  PSA has remained stable and he has consistently had a normal rectal exam.  We will see him on an annual basis or sooner if needed for new or worsening symptoms.

## 2021-01-21 ENCOUNTER — TELEPHONE (OUTPATIENT)
Dept: PULMONOLOGY | Age: 71
End: 2021-01-21

## 2021-01-21 DIAGNOSIS — G47.33 OBSTRUCTIVE SLEEP APNEA: Primary | ICD-10-CM

## 2021-01-21 NOTE — TELEPHONE ENCOUNTER
Patient called and requesting new Bipap mask and tubing supplies.     [employer] is his [employer] company

## 2021-02-18 ENCOUNTER — HOSPITAL ENCOUNTER (OUTPATIENT)
Dept: LAB | Age: 71
Setting detail: SPECIMEN
Discharge: HOME | End: 2021-02-18
Payer: MEDICARE

## 2021-02-18 DIAGNOSIS — Z01.818 PREOPERATIVE TESTING: ICD-10-CM

## 2021-02-18 DIAGNOSIS — Z01.818 PREOPERATIVE TESTING: Primary | ICD-10-CM

## 2021-02-18 PROCEDURE — U0005 INFEC AGEN DETEC AMPLI PROBE: HCPCS

## 2021-02-18 PROCEDURE — U0003 INFECTIOUS AGENT DETECTION BY NUCLEIC ACID (DNA OR RNA); SEVERE ACUTE RESPIRATORY SYNDROME CORONAVIRUS 2 (SARS-COV-2) (CORONAVIRUS DISEASE [COVID-19]), AMPLIFIED PROBE TECHNIQUE, MAKING USE OF HIGH THROUGHPUT TECHNOLOGIES AS DESCRIBED BY CMS-2020-01-R: HCPCS

## 2021-02-18 PROCEDURE — C9803 HOPD COVID-19 SPEC COLLECT: HCPCS

## 2021-02-19 LAB
SARS-COV-2: NORMAL
SARS-COV-2: NOT DETECTED
SOURCE: NORMAL

## 2021-02-25 ENCOUNTER — HOSPITAL ENCOUNTER (OUTPATIENT)
Dept: PULMONOLOGY | Age: 71
Discharge: HOME | End: 2021-02-25
Payer: MEDICARE

## 2021-02-25 DIAGNOSIS — J44.9 CHRONIC OBSTRUCTIVE PULMONARY DISEASE, UNSPECIFIED COPD TYPE (HCC): ICD-10-CM

## 2021-02-25 PROCEDURE — 94726 PLETHYSMOGRAPHY LUNG VOLUMES: CPT

## 2021-02-25 PROCEDURE — 6370000000 HC RX 637 (ALT 250 FOR IP): Performed by: INTERNAL MEDICINE

## 2021-02-25 PROCEDURE — 94664 DEMO&/EVAL PT USE INHALER: CPT

## 2021-02-25 PROCEDURE — 94060 EVALUATION OF WHEEZING: CPT

## 2021-02-25 PROCEDURE — 94729 DIFFUSING CAPACITY: CPT

## 2021-02-25 RX ORDER — ALBUTEROL SULFATE 90 UG/1
4 AEROSOL, METERED RESPIRATORY (INHALATION) ONCE
Status: COMPLETED | OUTPATIENT
Start: 2021-02-25 | End: 2021-02-25

## 2021-02-25 RX ADMIN — ALBUTEROL SULFATE 4 PUFF: 90 AEROSOL, METERED RESPIRATORY (INHALATION) at 10:42

## 2021-03-04 ENCOUNTER — OFFICE VISIT (OUTPATIENT)
Dept: PULMONOLOGY | Age: 71
End: 2021-03-04
Payer: MEDICARE

## 2021-03-04 VITALS
HEIGHT: 72 IN | SYSTOLIC BLOOD PRESSURE: 160 MMHG | WEIGHT: 263 LBS | HEART RATE: 72 BPM | BODY MASS INDEX: 35.62 KG/M2 | RESPIRATION RATE: 16 BRPM | DIASTOLIC BLOOD PRESSURE: 76 MMHG | TEMPERATURE: 98.1 F | OXYGEN SATURATION: 97 %

## 2021-03-04 DIAGNOSIS — G47.31 COMPLEX SLEEP APNEA SYNDROME: Primary | ICD-10-CM

## 2021-03-04 DIAGNOSIS — F17.210 SMOKING GREATER THAN 40 PACK YEARS: ICD-10-CM

## 2021-03-04 DIAGNOSIS — Z87.891 PERSONAL HISTORY OF TOBACCO USE: ICD-10-CM

## 2021-03-04 DIAGNOSIS — R91.1 LUNG NODULE: ICD-10-CM

## 2021-03-04 DIAGNOSIS — G25.81 RLS (RESTLESS LEGS SYNDROME): ICD-10-CM

## 2021-03-04 DIAGNOSIS — J44.9 CHRONIC OBSTRUCTIVE PULMONARY DISEASE, UNSPECIFIED COPD TYPE (HCC): ICD-10-CM

## 2021-03-04 PROCEDURE — G8417 CALC BMI ABV UP PARAM F/U: HCPCS | Performed by: INTERNAL MEDICINE

## 2021-03-04 PROCEDURE — 3023F SPIROM DOC REV: CPT | Performed by: INTERNAL MEDICINE

## 2021-03-04 PROCEDURE — 4004F PT TOBACCO SCREEN RCVD TLK: CPT | Performed by: INTERNAL MEDICINE

## 2021-03-04 PROCEDURE — G8926 SPIRO NO PERF OR DOC: HCPCS | Performed by: INTERNAL MEDICINE

## 2021-03-04 PROCEDURE — 99213 OFFICE O/P EST LOW 20 MIN: CPT | Performed by: INTERNAL MEDICINE

## 2021-03-04 PROCEDURE — 3017F COLORECTAL CA SCREEN DOC REV: CPT | Performed by: INTERNAL MEDICINE

## 2021-03-04 PROCEDURE — 4040F PNEUMOC VAC/ADMIN/RCVD: CPT | Performed by: INTERNAL MEDICINE

## 2021-03-04 PROCEDURE — G0296 VISIT TO DETERM LDCT ELIG: HCPCS | Performed by: INTERNAL MEDICINE

## 2021-03-04 PROCEDURE — G8427 DOCREV CUR MEDS BY ELIG CLIN: HCPCS | Performed by: INTERNAL MEDICINE

## 2021-03-04 PROCEDURE — G8484 FLU IMMUNIZE NO ADMIN: HCPCS | Performed by: INTERNAL MEDICINE

## 2021-03-04 PROCEDURE — 1123F ACP DISCUSS/DSCN MKR DOCD: CPT | Performed by: INTERNAL MEDICINE

## 2021-03-04 PROCEDURE — 99214 OFFICE O/P EST MOD 30 MIN: CPT | Performed by: INTERNAL MEDICINE

## 2021-03-04 NOTE — PATIENT INSTRUCTIONS
10%) of the cancers found would not affect your life expectancy, even if undetected and untreated. If you are still smoking, the single most important thing that you can do to reduce your risk of dying of lung cancer is to quit. For this screening to be covered by Medicare and most other insurers, strict criteria must be met. If you do not meet these criteria, but still wish to undergo LDCT testing, you will be required to sign a waiver indicating your willingness to pay for the scan.

## 2021-03-04 NOTE — PROGRESS NOTES
PULMONARY OUTPATIENT PROGRESS NOTE      Patient:  Juan Peterson  YOB: 1950    MRN: [id number]     Acct:        Pt seen and Chart reviewed. Mr/Ms Juan Peterson is here in followup for      Diagnosis Orders   1. Complex sleep apnea syndrome     2. Chronic obstructive pulmonary disease, unspecified COPD type ([facility] [address]     3. RLS (restless legs syndrome)     4. Lung nodule     5. Smoking greater than 40 pack years        Here for follow up for complex sleep apnea , COPD, restrictive lung disease, obesity, tobacco dependence, RLS and lung nodules and history of stable mild mediastinal apathy    Since he was seen last time he did not have any exacerbation of COPD/asthma did not require steroids he had a visit. He denies daily or persistent cough and denies seen. He is able to do regular activities and denies shortness of breath he does not have any stairs at home when the trailer and usually does not have to take long walks. According to patient he is active and alert and is good in winter and also doing insulation off the house without getting shortness of breath. He denies nocturnal awakening with cough wheezing chest tightness or shortness of breath. Denies orthopnea PND or pedal edema. His restless leg syndrome symptoms controlled on Requip. He usually take Eliquis around 7 PM  He is taking DuoNeb aerosol 3 times a day denies frequent use of albuterol otherwise. He is on BiPAP of 25/21. According to patient he is tolerating it well he is using it with full mask. He is using every night without problem. Sleep is refreshing and restorative. He usually does not take nap does not doze off during the daytime and most of the days he is energetic and denies fatigue and tiredness. Last compliance data is available from BiPAP was 07/20/2020 to 0/15/20 and his compliance is 100%.   Average AHI is 4.3 on BiPAP of 25/21 and average usage is 6 hours and 16 minutes. He is on chronic methadone therapy and currently on 5 mg of methadone. He is still smoking cigarettes and smoking 1 pack/day unable to stop smoking. Last CT scan of the chest for follow-up of right upper lobe and right lower lobe lung nodule  in June 2020 shows a stable lung nodules without much change. Also he had right pretracheal lymph node 13 mm and prevascular lymph node they had been small nonspecific and had been stable since 2016. Sleep Questionnaire on 03/04/2021  Positive dry mouth upon awakening. Negative fatigue and tiredness during the day. Goes to sleep at 11 to 11 30 wakes up 7 to 8 am. It takes few minutes to fall asleep. Takes no nap during the day. No headache in am. No car wrecks or near wrecks because of the sleepiness. No nodding off while driving. No weight gain. No forgetfulness or decreased concentration. No nasal congestion or obstruction at night. Using BIPAP 6 hr/night. No leg jerks during sleep. No restless feelings in legs at night except occasionally ( takes requip). No numbness or burning in leg or feet. No leg aches or cramps . No loss of muscle strength when angry or laugh. No hallucination when dozing off or waking up from sleep. No paralysis upon awakening from sleep or going to sleep. No teeth grinding, nightmares, sleep walking. No night time panic attacks.       ESS is 1    ESS:   Sitting and reading 1  Watching TV 0  Sitting inactive in a public place (e.g a theater or a meeting) 0  As a passenger in a car for an hour without a break 0  Lying down to rest in the afternoon when circumstances permit 0  Sitting and talking to someone 0  Sitting quietly after a lunch without alcohol 0  In a car, while stopped for a few minutes in traffic 0    Subjective:   Review of Systems - General ROS: negative and positive for fatigue  ENT ROS: positive for post nasal drip and nasal congestion  Allergy and Immunology ROS: negative for skin rash and Place 0.4 mg under the tongue every 5 minutes as needed. , Disp: , Rfl:     ropinirole (REQUIP) 1 MG tablet, Take 1 mg by mouth nightly.  , Disp: , Rfl:     methadone (DOLOPHINE) 5 MG tablet, Take 5 mg by mouth 2 times daily. , Disp: , Rfl:       Objective:    Physical Exam:  Vitals:   BP (!) 160/76   Pulse 72   Temp 98.1 °F (36.7 °C)   Resp 16   Ht 6' (1.829 m)   Wt 263 lb (119.3 kg)   SpO2 97%   BMI 35.67 kg/m²   Last 3 weights: Wt Readings from Last 3 Encounters:   03/04/21 263 lb (119.3 kg)   09/18/20 269 lb 12.8 oz (122.4 kg)   08/20/20 270 lb (122.5 kg)     Body mass index is 35.67 kg/m². Physical Examination:   General appearance - alert, well appearing, and in no distress  Mental status - alert, oriented to person, place, and time  Eyes - pupils equal and reactive, extraocular eye movements intact  Nose - normal and patent, no erythema, discharge or polyps  Mouth - mucous membranes moist, Small oropharynx, large tongue, Mallampati 2  Neck - supple, no significant adenopathy, thick and short  Chest - No distress, retractions or cyanosis, bilateral symmetrical chest movement, air entry is bilateral without expiratory wheezing rhonchi or crackles. Heart - normal rate, regular rhythm, normal S1, S2, no murmurs, rubs, clicks or gallops  Abdomen - soft, nontender, nondistended, no masses or organomegaly  Neurological - alert, oriented, normal speech, no focal findings or movement disorder noted  Extremities - peripheral pulses normal, no pedal edema, no clubbing or cyanosis  Skin - normal coloration and turgor, no rashes, no suspicious skin lesions noted     Labs:  None    CBC: No results for input(s): WBC, HGB, PLT in the last 72 hours. BMP:  No results for input(s): NA, K, CL, CO2, BUN, CREATININE, GLUCOSE in the last 72 hours. S. Calcium:No results for input(s): CALCIUM in the last 72 hours. S. Ionized Calcium:No results for input(s): IONCA in the last 72 hours.   S. Magnesium:No results for input(s): MG in the last 72 hours. S. Phosphorus:No results for input(s): PHOS in the last 72 hours. S. Glucose:No results for input(s): POCGLU in the last 72 hours. Glycosylated hemoglobin A1C: No results for input(s): LABA1C in the last 72 hours. Pulmonary Functions Testing Results:    4/10/18  The flow volume loop is restrictive. FEV1 63% predicated, FVC 60%  predicated. There was a 11% bronchodilator change in FEV1.  FEV1/FVC ratio is 79, post bronchodilator 81. Lung volumes by body box show TLC 85% predicted. % predicted. Diffusion capacity is 62% predicated. Airway resistance normal.    DATE: 10/11/2016     INTERPRETATION:  Flow volume loop shows a restrictive defect. FEV1 is 67% of predicted. FVC is 62% of predicted. Ratio of FEV1 to FVC is 81. Lung volumes by body  box total lung capacity 80% of predicted, RV is 121% of predicted,  diffusion capacity is 71% of predicted. No results found for: FEV1, FVC, RUI5BVD, TLC, DLCO    Blood Gases: No results found for: PH, PCO2, PO2, HCO3, O2SAT    Radiological reports:    CXR      CT Scans  06/16/2020  Mediastinum:  Stable scattered mediastinal lymph nodes up to 13 mm in short    axis.  Largest is 13 mm in the low right paratracheal region.  Cardiac size    normal.  Normal caliber aorta.  Thyroid gland grossly normal.  Patulous    esophagus noted.         Lungs/Pleura:  Mild centrilobular emphysema.         6 mm solid noncalcified nodule posterior segment right upper lobe unchanged.         A 4 mm solid noncalcified nodule in the posterior basal right lower lobe on    series 3, image 94 is also unchanged.  No significant new nodules or masses. 04/10/19  Stable 6 mm solid noncalcified nodule in the posterior segment right upper   lobe.        04/10/18  1. Stable 6 mm noncalcified pulmonary nodule right upper lobe   2. Stable prominent left hilar lymph node and enlarged pretracheal lymph node          5/4/17  1.  Stable benign 6 mm pulmonary nodule right upper lobe   2. Stable prominent mediastinal lymph nodes       4/25/2016                                      1. 6 mm noncalcified pulmonary nodule right upper lobe   2. Prominent left hilar lymph node and enlarged pretracheal lymph node     I have reviewed the Ct chest and R paratracheal LN is small and likley benign and L Hilar LN difficult to evaluate without contrast      EKG:   ECHO:     Compliance data seen from 10/15/17 to 11/13/17 30 days  97% compliance on auto cpap 16/ 6 cm AHI 22 Central apnea 8/hr min  average use 6H and 4 min       Compliance data seen from march to April 2019 for 30 days  100% compliance on cpap 15 cm AHI 12 and average use 6H and 18    Compliance data from September to October 2019 for 30 days. Compliance 100% on CPAP 16 cm, average AHI 12.6    Compliance data   07/20/2020 to 08/18/2020  Compliance 100%. Compliance for greater than 4-hour usage 90%. Average AHI 4.3. Average usage 6 hours 19 minutes  BiPAP pressure 25/21. Assessment:    ICD-10-CM    1. Complex sleep apnea syndrome  G47.31    2. Chronic obstructive pulmonary disease, unspecified [facility] ([facility]  J44.9    3. RLS (restless legs syndrome)  G25.81    4. Lung nodule  R91.1    5. Smoking greater than 40 pack years  F17.210      CT chest from June 2020 as compared to April 2019 and 2018 showed stable right upper lobe lung nodule and stable mediastinal adenopathy without much change in paratracheal lymph node from CT scan in 2016, May 2017 and April 2018    PLAN:  Lung nodule is stable for 2 years on CT scan. Annual low-dose screening CT scan and next would be in June 2021and requested. Completed smoking cessation discussed again today. Pulmonary function test from 2016 and from April 2019 showed almost stable finding on pulmonary function test  He is not able to afford Incruse. DuoNeb aerosol 3 times a day and as needed. Home nebulizer  Refill given  Albuterol to be used as needed.   Continue Requip

## 2021-05-06 ENCOUNTER — HOSPITAL ENCOUNTER (OUTPATIENT)
Dept: DIABETES SERVICES | Age: 71
Setting detail: THERAPIES SERIES
Discharge: HOME | End: 2021-05-06
Payer: MEDICARE

## 2021-05-06 PROCEDURE — G0108 DIAB MANAGE TRN  PER INDIV: HCPCS

## 2021-05-06 RX ORDER — METFORMIN HYDROCHLORIDE 500 MG/1
500 TABLET, EXTENDED RELEASE ORAL
COMMUNITY

## 2021-05-06 SDOH — ECONOMIC STABILITY: FOOD INSECURITY: ADDITIONAL INFORMATION: NO

## 2021-05-06 NOTE — PROGRESS NOTES
Diabetes Self-Management Education Record    Progress Note:  Patient arrived to appointment with wife for a new diagnosis of Type 2 diabetes for intial assessment for diabetes education. Yandy Cedra was just diagnosed a few weeks ago with an A1C of 7.0 on 04/19/21. His mother and brother had Type 2 diabetes. He has been started on Metformin 500 mg daily and denies any side effects or missed doses. He is checking his glucose on to two times a day with fasting range of 113-122 and after meals . His meals are inconsistent. He used to only eat one meal a day at supper. He has started having breakfast but reports feeling full at lunch and not hungry so he skips. He does admit to episodes of hypoglycemia off and on. This typically happens in the middle of the afternoon and describes feeling shaky, weak, and sweaty. Typically he grabs a candy bar. Handout given and reviewed on hypoglycemia. Discuss other options for treatment as chocolate has fat and this is not recommended. Encourage fast acting carb only with no protein or fat. Discuss importance of following with carb protein snack or a meal.  There is no physical activity and he does report physical limitations from a neck surgery and has limited movement on left side. Discuss the importance of eating consistent, with breakfast within an hour of getting up, and then each meal after 4-5 hours and a HS snack. Encourage him to add something at lunch even if it is a snack at the beginning and gradually increase until a full meal is being consumed. He is given My Carbohydrate Guide and this is reviewed. Discuss working up to 60 grams of carbohydrate a meal until he sees dietitian for further instruction. Reviewed common serving sizes of carbohydrate foods. Given handout on chair exercises he could perform and encourage him to limit time sitting and get up and move at least every hour. Discuss You Tube as another option for chair exercises.     He will continue to monitor glucose and record results. Review target ranges and given handout. He is interested in seeing dietitian and scheduling for classes. His wife plans to attend well who also has diabetes and has received education in the past.  All questions answered and encouraged to call with questions or concerns.     Participant Name: Von Burciaga   Referring Provider:  Carolin Rolbedo MD    Keys to learning:  Considerations: []Language []Emotional []Health Literacy  []Cognitive []Memory changes []Financial []Cultural   []Nondenominational []Vision []Hearing  []Speech []Lack of desire  []Literacy  []Psycho-social  [x]None [x] Covid-19 group restrictions  If considerations are noted, accommodations made:     Identified barriers to learning/self management: physical limitations    The following information was discussed:    [] Diabetes disease process and treatment options   [x] Healthy nutrition, carbohydrate counting, meal planning  [] Monitoring blood glucose and other parameters; interpreting and using results  [x] Acute complications--prevention, detection and treatment  [] Medication management and safety   [x] Incorporating physical activity into lifestyle   [x] Exercise for Health, Reducing Risks for Heart Disease, Diabetes and Heart Health  [] Preventing, through risk reduction behaviors, detecting, and treating chronic complications  [] Sick Day management  [] Developing personalized strategies to address psychosocial issues and concerns  [x] Developing personalized strategies to promote health and behavior change through goalsetting, behavior change strategies aimed at risk reduction  [] Special situations--disaster planning, travel, social activities  [] Foot, skin, and dental care    Session Assessment & Evaluation Ratings:  1=Needs Instruction  2=Needs Review  3=Comprehends Key Points  4=Demonstrates Understanding/Competency  NC=Not Covered   N/A=Not Applicable Initial  Assess    Date:  05/06/21 2nd  Visit     Date:   3rd  Visit    Date: 4th  Visit    Date: Comments  S.O.C=Stage of Change/Readiness to change:  · Pre=Pre-contemplation stage--not thinking about changing  · C=Contemplation stage--ambivalent about changing  · P=Preparation stage--prepared to made a specific change  · A=Action stage--committed to modify behaviors  · M=Maintenance and relapse prevention--incorporating new behavior   Participant Stated  Goal        I will eat something at lunch everyday and gradually increase until consuming a full meal.                    Participant Stated Goal  I will start some chair exercises from handout provided and search You Tube for other options. S.O.C  [] PRE  [x] C  [] P      [] A  [] M                    S.O.C  [] PRE  [x] C  [] P      [] A  [] M     S.O.C  [] PRE  [] C  [] P      [] A  [] M                     S.O.C  [] PRE  [] C  [] P      [] A  [] M      S.O.C  [] PRE  [] C  [] P      [] A  [] M                    S.O.C  [] PRE  [] C  [] P      [] A  [] M     S.O.C  [] PRE  [] C  [] P      [] A  [] M                    S.O.C  [] PRE  [] C  [] P      [] A  [] M   Current main goal attainment frequency:   [x] Never  [] Some  [] Half  [] Most  [] All    Participant confidence to master goal this visit:   [] Excellent  [x] Good  [] [address]  [] Poor            Current main goal attainment frequency:   [x] Never  [] Some  [] Half  [] Most  [] All    Participant confidence to master goal this visit:   [] Excellent  [x] Good [] Fair  [] Poor                  Session  Topics & Learning Objectives:      Comments:   Diabetes disease process & Treatment process: Define diabetes & prediabetes; identify own type of diabetes; role of the pancreas; signs/symptoms; diagnostic criteria; prevention and treatment options; contributing factors.                   Rating  [x] 1  [] 2  [] 3  [] 4      [] NC  [] N/A   Rating  [] 1  [] 2  [] 3  [] 4  [] NC  [] N/A     Rating  [] 1  [] 2  [] 3  [] 4  [] NC  [] N/A 2  [] 3  [] 4  [] NC  [] N/A       Rating  [] 1  [] 2  [] 3  [] 4  [] NC  [] N/A     Rating  [] 1  [] 2  [] 3  [] 4  [] NC  [] N/A         Prevention, detection & treatment of acute complications: List symptoms of hyper- and hypoglycemia; Describe how to treat low blood sugar & actions for lowering high blood glucose levels. Prevention and treatment strategies. Rating  [x] 1  [] 2  [] 3  [] 4  [] NC  [] N/A   Rating  [] 1  [] 2  [] 3  [] 4  [] NC  [] N/A   Rating  [] 1  [] 2  [] 3  [] 4  [] NC  [] N/A Rating  [] 1  [] 2  [] 3  [] 4  [] NC  [] N/A                   Describe sick day guidelines and indications for physician notification. Rating  [x] 1  [] 2  [] 3  [] 4  [] NC  [] N/A     Rating  [] 1  [] 2  [] 3  [] 4  [] NC  [] N/A     Rating  [] 1  [] 2  [] 3  [] 4  [] NC  [] N/A     Rating  [] 1  [] 2  [] 3  [] 4  [] NC  [] N/A        Prevention, detection & treatment of chronic complications: Define the natural course of diabetes & describe the relationship of blood glucose levels to long term complications of diabetes. Identify preventative measures and standard of care. Rating  [x] 1  [] 2  [] 3  [] 4  [] NC  [] N/A     Rating  [] 1  [] 2  [] 3  [] 4  [] NC  [] N/A     Rating  [] 1  [] 2  [] 3  [] 4  [] NC  [] N/A     Rating  [] 1  [] 2  [] 3  [] 4  [] NC  [] N/A      Developing strategies to address psychosocial issues: Describe feelings about living with diabetes; Identify support needed & support network. Describe how stress, depression, and anxiety affect blood glucose. Identify coping strategies. Rating  [x] 1  [] 2  [] 3  [] 4  [] NC  [] N/A       Rating  [] 1  [] 2  [] 3  [] 4  [] NC  [] N/A     Rating  [] 1  [] 2  [] 3  [] 4  [] NC  [] N/A     Rating  [] 1  [] 2  [] 3  [] 4  [] NC  [] N/A          Developing strategies to promote health/change behavior:  Define the ABCs of diabetes; Identify appropriate screenings, schedule & personal plan for screenings. Identify 7 self-care behaviors. Benefits, challenges and strategies for behavioral change. Rating  [x] 1  [] 2  [] 3  [] 4  [] NC  [] N/A     Rating  [] 1  [] 2  [] 3  [] 4  [] NC  [] N/A     Rating  [] 1  [] 2  [] 3  [] 4  [] NC  [] N/A     Rating  [] 1  [] 2  [] 3  [] 4  [] NC  [] N/A             Time spent with patient: [id number]    Next Appointment:  Classes and dietitian   Instruction Method: [x]Lecture/Discussion  []Power Point Presentation  [x]Handouts  []Return Demonstration     Education Materials/Equipment Provided:      [x]Self-Management - Initial assessment - ADA  Where do I Begin? Living with Type 2 diabetes\" booklet;  Diet meal planning basics, handout on diabetes education classes, hyper/hypoglycemia signs/symptoms and treatment handout; Diabetes zones; Support plan resource list.      []Self-Management  Class 1 - Diabetes: Your Take Control Guide\" booklet. Healthy Interactions [facility] \"On The Road To Better Managing Your Diabetes\". [] Self-Management  Class 2 -  \"Traveling with Diabetes\", Dining Out With Diabetes, \"Coping with Diabetes\", \"Diabetes Disaster Planning\", \"Know Your Numbers/Diabetes Care Checklist\", [address] [place] [address] Blood Sugar, Low Blood Sugar. Healthy Interactions Map \"Monitoring Your Blood Glucose. \"     [] Self-Management  Class 3 - \"Risk Factors for CVD\", foot care tips sheet and \"How to pick the right shoe\", \"Medications Used To Treat Diabetes\", How To Care For Your Teeth and Gums\", \"Vaccinations For Adults with Diabetes\", \"Type 2 diabetes and the role of GLP-1\". Healthy Interactions Map \"Continuing Your Journey\". []Self-Management - 3 month follow-up      []Glucose Meter      []Insulin Kit      []Other        Handouts/Booklets given:     [] Diabetes-Your Take Control Guide   [] Daily Diabetic Meal Planning Guide   [] Nutrition in the [place] [address]    [] Resources for People With Diabetes   [] Other       Diabetes Self Management Support Plan:        To assist and support your continued progress in managing your diabetes following education-    ( X )  Gym or exercise program of your choice. (Suggestions:  [employer], Circuit training, any exercise facility and your local Physical Therapy or Cardiac Rehabilitation exercise Program)      (  )   Library    (  )    ADA website:  Mob.ly.ca. org    (  )   Http: DotProtection.gl    (  )   Diabetes Forecast [place] you may get this information on the ADA web site.     (  )  Diabetes Interview - [place]   [phone]    (  )  Diabetes Self Management (bi-monthly magazine)  [phone]    (  ) Support group: (  ) Support group: Allie first Tuesday of the month at 9 am and Charanjit botello third Wednesday of the month at 9 am    (  )  Health Journeys [employer]  (relaxation tapes for people with Diabetes)  [phone]    (  )  Your suggestions:                      Oscar Kapoor RN, BSN, Krista Machado

## 2021-05-17 ENCOUNTER — HOSPITAL ENCOUNTER (OUTPATIENT)
Dept: NUTRITION | Age: 71
Discharge: HOME | End: 2021-05-17
Payer: MEDICARE

## 2021-05-17 PROCEDURE — 97802 MEDICAL NUTRITION INDIV IN: CPT

## 2021-06-03 ENCOUNTER — HOSPITAL ENCOUNTER (OUTPATIENT)
Dept: DIABETES SERVICES | Age: 71
Setting detail: THERAPIES SERIES
Discharge: HOME | End: 2021-06-03
Payer: MEDICARE

## 2021-06-03 PROCEDURE — G0108 DIAB MANAGE TRN  PER INDIV: HCPCS

## 2021-06-03 NOTE — PROGRESS NOTES
while exercising. Rating  [x] 1  [] 2  [] 3  [] 4  [] NC  [] N/A     Rating  [x] 1  [] 2  [] 3  [] 4  [] NC  [] N/A       Rating  [] 1  [] 2  [] 3  [] 4  [] NC  [] N/A     Rating  [] 1  [] 2  [] 3  [] 4  [] NC  [] N/A           Using medications safely: State effect of diabetes medicines on diabetes;   Name diabetes medication taking, action, timing & side effects. Rating  [x] 1  [] 2  [] 3  [] 4  [] NC  [] N/A       Rating  [] 1  [x] 2  [] 3  [] 4  [] NC  [] N/A         Rating  [] 1  [] 2  [] 3  [] 4  [] NC  [] N/A   Rating  [] 1  [] 2  [] 3  [] 4  [] NC  [] N/A      ________             Insulin/injectable-  Appropriate injection site; proper storage; proper technique; safe needle disposal.   Rating  [] 1  [] 2  [] 3  [] 4  [] NC  [x] N/A Rating  [] 1  [] 2  [] 3  [] 4  [] NC  [x] N/A Rating  [] 1  [] 2  [] 3  [] 4  [] NC  [] N/A Rating  [] 1  [] 2  [] 3  [] 4  [] NC  [] N/A        Monitoring blood glucose, interpreting and using results: Identify recommended blood glucose targets, personal targets, A1C target, importance of logging glucose,appropriate techniques and problem solving. Safe lancet disposal.    Rating  [x] 1  [] 2  [] 3  [] 4  [] NC  [] N/A     Rating  [] 1  [x] 2  [] 3  [] 4  [] NC  [] N/A       Rating  [] 1  [] 2  [] 3  [] 4  [] NC  [] N/A     Rating  [] 1  [] 2  [] 3  [] 4  [] NC  [] N/A         Prevention, detection & treatment of acute complications: List symptoms of hyper- and hypoglycemia; Describe how to treat low blood sugar & actions for lowering high blood glucose levels. Prevention and treatment strategies. Rating  [x] 1  [] 2  [] 3  [] 4  [] NC  [] N/A   Rating  [] 1  [x] 2  [] 3  [] 4  [] NC  [] N/A   Rating  [] 1  [] 2  [] 3  [] 4  [] NC  [] N/A Rating  [] 1  [] 2  [] 3  [] 4  [] NC  [] N/A                   Describe sick day guidelines and indications for physician notification.    Rating  [x] 1  [] 2  [] 3  [] 4  [] NC  [] N/A     Rating  [x] 1  [] 2  [] 3  [] 4  [] NC  [] N/A     Rating  [] 1  [] 2  [] 3  [] 4  [] NC  [] N/A     Rating  [] 1  [] 2  [] 3  [] 4  [] NC  [] N/A        Prevention, detection & treatment of chronic complications: Define the natural course of diabetes & describe the relationship of blood glucose levels to long term complications of diabetes. Identify preventative measures and standard of care. Rating  [x] 1  [] 2  [] 3  [] 4  [] NC  [] N/A     Rating  [x] 1  [] 2  [] 3  [] 4  [] NC  [] N/A     Rating  [] 1  [] 2  [] 3  [] 4  [] NC  [] N/A     Rating  [] 1  [] 2  [] 3  [] 4  [] NC  [] N/A      Developing strategies to address psychosocial issues: Describe feelings about living with diabetes; Identify support needed & support network. Describe how stress, depression, and anxiety affect blood glucose. Identify coping strategies. Rating  [x] 1  [] 2  [] 3  [] 4  [] NC  [] N/A       Rating  [] 1  [] 2  [x] 3  [] 4  [] NC  [] N/A     Rating  [] 1  [] 2  [] 3  [] 4  [] NC  [] N/A     Rating  [] 1  [] 2  [] 3  [] 4  [] NC  [] N/A          Developing strategies to promote health/change behavior:  Define the ABCs of diabetes; Identify appropriate screenings, schedule & personal plan for screenings. Identify 7 self-care behaviors. Benefits, challenges and strategies for behavioral change. Rating  [x] 1  [] 2  [] 3  [] 4  [] NC  [] N/A     Rating  [x] 1  [] 2  [] 3  [] 4  [] NC  [] N/A     Rating  [] 1  [] 2  [] 3  [] 4  [] NC  [] N/A     Rating  [] 1  [] 2  [] 3  [] 4  [] NC  [] N/A             Time spent with patient: [id number]    Next Appointment:  Classes and dietitian   Instruction Method: [x]Lecture/Discussion  []Power Point Presentation  [x]Handouts  []Return Demonstration     Education Materials/Equipment Provided:      []Self-Management - Initial assessment - ADA  Where do I Begin?  Living with Type 2 diabetes\" booklet;  Diet meal planning basics, handout on diabetes education classes, hyper/hypoglycemia signs/symptoms and treatment handout; Diabetes zones; Support plan resource list.      [x]Self-Management  Class 1 - Diabetes: Your Take Control Guide\" booklet. Healthy Interactions [facility] \"On The Road To Better Managing Your Diabetes\". [] Self-Management  Class 2 -  \"Traveling with Diabetes\", Dining Out With Diabetes, \"Coping with Diabetes\", \"Diabetes Disaster Planning\", \"Know Your Numbers/Diabetes Care Checklist\", [address] [place] [address] Blood Sugar, Low Blood Sugar. Healthy Interactions Map \"Monitoring Your Blood Glucose. \"     [] Self-Management  Class 3 - \"Risk Factors for CVD\", foot care tips sheet and \"How to pick the right shoe\", \"Medications Used To Treat Diabetes\", How To Care For Your Teeth and Gums\", \"Vaccinations For Adults with Diabetes\", \"Type 2 diabetes and the role of GLP-1\". Healthy Interactions Map \"Continuing Your Journey\". []Self-Management - 3 month follow-up      []Glucose Meter      []Insulin Kit      []Other        Handouts/Booklets given:     [] Diabetes-Your Take Control Guide   [] Daily Diabetic Meal Planning Guide   [] Nutrition in the [employer] Resources    [] Resources for People With Diabetes   [] Other       Diabetes Self Management Support Plan: To assist and support your continued progress in managing your diabetes following education-    ( X )  Gym or exercise program of your choice. (Suggestions:  [employer], Circuit training, any exercise facility and your local Physical Therapy or Cardiac Rehabilitation exercise Program)      (  )   Library    (  )    ADA website:  Smartbill - Recurrence Backoffice.ca. org    (  )   Http: DotProtection.gl    (  )   Diabetes Forecast [place] you may get this information on the ADA web site.     (  )  Diabetes Interview - [place]   [phone]    (  )  Diabetes Self Management (bi-monthly magazine)  [phone]    ( X ) Support group: (  ) Support group: Allie first Tuesday of the month at 9 am and Fiordaliza Villanueva third Wednesday of the month at 9 am    (  )  Health Journeys [employer]  (relaxation tapes for people with Diabetes)  [phone]    (  )  Your suggestions:                      Roc Galarza RN, BSN, Donis Kirkland

## 2021-06-16 ENCOUNTER — TELEPHONE (OUTPATIENT)
Dept: ONCOLOGY | Age: 71
End: 2021-06-16

## 2021-06-16 NOTE — LETTER
6/16/2021        [address]    Dear Kenny Connell:    Your healthcare provider has ordered a low dose CT scan of the chest for lung cancer screening. You will find enclosed, information about CT lung screening. Please review the statement of understanding, you will be asked to sign a copy of this at the time of your CT scan    If you have not already been contacted to make the appointment for your scan, please call our scheduling department at [phone]    Keep in mind that CT lung screening does not take the place of smoking cessation. If you are a current smoker, you will find enclosed smoking cessation resources. Please do not hesitate to contact me if you have any questions or concerns.     [facility],      [facility]  [phone]

## 2021-06-23 ENCOUNTER — HOSPITAL ENCOUNTER (OUTPATIENT)
Dept: CT IMAGING | Age: 71
Discharge: HOME | End: 2021-06-25
Payer: MEDICARE

## 2021-06-23 DIAGNOSIS — Z87.891 PERSONAL HISTORY OF TOBACCO USE: ICD-10-CM

## 2021-06-23 PROCEDURE — 71271 CT THORAX LUNG CANCER SCR C-: CPT

## 2021-09-02 ENCOUNTER — OFFICE VISIT (OUTPATIENT)
Dept: PULMONOLOGY | Age: 71
End: 2021-09-02
Payer: MEDICARE

## 2021-09-02 VITALS
WEIGHT: 265.1 LBS | DIASTOLIC BLOOD PRESSURE: 79 MMHG | RESPIRATION RATE: 16 BRPM | BODY MASS INDEX: 35.91 KG/M2 | OXYGEN SATURATION: 96 % | HEIGHT: 72 IN | TEMPERATURE: 98.2 F | SYSTOLIC BLOOD PRESSURE: 158 MMHG | HEART RATE: 81 BPM

## 2021-09-02 DIAGNOSIS — E66.9 OBESITY (BMI 35.0-39.9 WITHOUT COMORBIDITY): ICD-10-CM

## 2021-09-02 DIAGNOSIS — R91.1 LUNG NODULE: ICD-10-CM

## 2021-09-02 DIAGNOSIS — J44.9 CHRONIC OBSTRUCTIVE PULMONARY DISEASE, UNSPECIFIED COPD TYPE (HCC): ICD-10-CM

## 2021-09-02 DIAGNOSIS — F17.210 SMOKING GREATER THAN 40 PACK YEARS: ICD-10-CM

## 2021-09-02 DIAGNOSIS — G25.81 RLS (RESTLESS LEGS SYNDROME): ICD-10-CM

## 2021-09-02 DIAGNOSIS — G47.31 COMPLEX SLEEP APNEA SYNDROME: Primary | ICD-10-CM

## 2021-09-02 PROCEDURE — 99213 OFFICE O/P EST LOW 20 MIN: CPT | Performed by: INTERNAL MEDICINE

## 2021-09-02 PROCEDURE — 1123F ACP DISCUSS/DSCN MKR DOCD: CPT | Performed by: INTERNAL MEDICINE

## 2021-09-02 PROCEDURE — G8926 SPIRO NO PERF OR DOC: HCPCS | Performed by: INTERNAL MEDICINE

## 2021-09-02 PROCEDURE — 3023F SPIROM DOC REV: CPT | Performed by: INTERNAL MEDICINE

## 2021-09-02 PROCEDURE — 3017F COLORECTAL CA SCREEN DOC REV: CPT | Performed by: INTERNAL MEDICINE

## 2021-09-02 PROCEDURE — 4004F PT TOBACCO SCREEN RCVD TLK: CPT | Performed by: INTERNAL MEDICINE

## 2021-09-02 PROCEDURE — G8417 CALC BMI ABV UP PARAM F/U: HCPCS | Performed by: INTERNAL MEDICINE

## 2021-09-02 PROCEDURE — 4040F PNEUMOC VAC/ADMIN/RCVD: CPT | Performed by: INTERNAL MEDICINE

## 2021-09-02 PROCEDURE — 99214 OFFICE O/P EST MOD 30 MIN: CPT | Performed by: INTERNAL MEDICINE

## 2021-09-02 PROCEDURE — G8428 CUR MEDS NOT DOCUMENT: HCPCS | Performed by: INTERNAL MEDICINE

## 2021-09-02 RX ORDER — LISINOPRIL 20 MG/1
20 TABLET ORAL DAILY
COMMUNITY
Start: 2021-08-19

## 2021-09-02 NOTE — PATIENT INSTRUCTIONS
SURVEY:    You may be receiving a survey from [employer] regarding your visit today. Please complete the survey to enable us to provide the highest quality of care to you and your family. If you cannot score us a very good on any question, please call the office to discuss how we could have made your experience a very good one. Thank you. Please recheck your blood pressure when you go home and make sure you take your prescribed medication if applicable . Please follow up with your PCP or ER if needed.

## 2021-09-02 NOTE — PROGRESS NOTES
PULMONARY OUTPATIENT PROGRESS NOTE      Patient:  Obie Waddell  YOB: 1950    MRN: [id number]     Acct:        Pt seen and Chart reviewed. Mr/Ms Obie Waddell is here in followup for      Diagnosis Orders   1. Complex sleep apnea syndrome     2. Chronic obstructive pulmonary disease, unspecified COPD type (HCC)     3. Lung nodule     4. Smoking greater than 40 pack years     5. RLS (restless legs syndrome)     6. Obesity (BMI 35.0-39.9 without comorbidity)        Here for follow up for complex sleep apnea , COPD, restrictive lung disease, obesity, tobacco dependence, RLS and lung nodules and history of stable mild mediastinal apathy    Since he was seen last time he did not have any exacerbation of COPD/asthma did not have any steroids or antibiotic use or ER visit. He does not complain of cough cough is hardly any. He does not complain of sputum production. Denies any change in the color of the sputum if any. He does not have wheezing except occasionally. He denies nocturnal awakening with cough wheezing chest tightness or shortness of breath. According to patient he is active he is able to do regular activities without getting shortness of breath when he has to do strenuous activity he may get short of breath otherwise denies shortness of breath. He does have history of restless leg syndrome he is on Requip symptoms are controlled. He does take DuoNeb 3 times a day denies taking albuterol inhaler or aerosol otherwise. He continues to smoke about 3/4 pack/day unable to stop smoking. He is very compliant with BiPAP he use it every night does not sleep without BiPAP denies taking naps and denies dozing off during the daytime. Compliance data is available from 02/03/2021  To 08/31/2021. Compliance is 100% for 30 days. Average AHI is 1.9. Average usage is 5 hours 31 minutes on BiPAP of 25/21.     He had a low-dose screening CT scan done in June 2021 which did not show any changes 6 mm right upper lobe nodule and 4 mm right lower lobe nodule by emphysematous changes seen. Nodules are stable  Also he had right pretracheal lymph node 13 mm and prevascular lymph node they had been small nonspecific and had been stable since 2016. Sleep Questionnaire on 09/01/2021  Positive dry mouth upon awakening. Negative fatigue and tiredness during the day. Goes to sleep at 11 30 wakes up 6 to 7 am. It takes few minutes to fall asleep. Takes no nap during the day. No headache in am. No car wrecks or near wrecks because of the sleepiness. No nodding off while driving. No weight gain. No forgetfulness or decreased concentration. No nasal congestion or obstruction at night. Using BIPAP 6 hr/night. No leg jerks during sleep. No restless feelings in legs at night except occasionally ( takes requip). No numbness or burning in leg or feet. No leg aches or cramps . No loss of muscle strength when angry or laugh. No hallucination when dozing off or waking up from sleep. No paralysis upon awakening from sleep or going to sleep. No teeth grinding, nightmares, sleep walking. No night time panic attacks.       ESS is 0    ESS:   Sitting and reading 0  Watching TV 0  Sitting inactive in a public place (e.g a theater or a meeting) 0  As a passenger in a car for an hour without a break 0  Lying down to rest in the afternoon when circumstances permit 0  Sitting and talking to someone 0  Sitting quietly after a lunch without alcohol 0  In a car, while stopped for a few minutes in traffic 0    Subjective:   Review of Systems - General ROS: negative and positive for fatigue  ENT ROS: positive for post nasal drip and nasal congestion  Allergy and Immunology ROS: negative for skin rash and urticaria  Respiratory ROS: no cough, no shortness of breath on exertion , no wheezing  Cardiovascular ROS: no chest pain or dyspnea on exertion  Gastrointestinal ROS: no abdominal pain, change in bowel habits, or black or bloody stools  Musculoskeletal ROS: positive for back pain  NO snoring, positive excessive daytime sleepiness, disrupted sleep, no naps  CNS: negative for dizziness, diplopia, weakness, syncope  Skin: no rash and skin lesion  : negative for hematuria, nocturia and dysuria  Hem/Onc: no easy bruising and no easy bleeding    Allergies:  No Known Allergies    Medications:    Current Outpatient Medications:     metFORMIN (GLUCOPHAGE-XR) 500 MG extended release tablet, Take 500 mg by mouth daily (with breakfast), Disp: , Rfl:     furosemide (LASIX) 20 MG tablet, Take 20 mg by mouth daily, Disp: , Rfl:     atorvastatin (LIPITOR) 20 MG tablet, Take 20 mg by mouth daily, Disp: , Rfl:     ipratropium-albuterol (DUONEB) 0.5-2.5 (3) MG/3ML SOLN nebulizer solution, Inhale 3 mLs into the lungs three times daily, Disp: 360 mL, Rfl: 11    acetaminophen (TYLENOL) 500 MG tablet, Take 500 mg by mouth every 6 hours as needed for Pain, Disp: , Rfl:     fluticasone (FLONASE) 50 MCG/ACT nasal spray, 1 spray by Each Nostril route as needed , Disp: , Rfl:     escitalopram (LEXAPRO) 20 MG tablet, Take 20 mg by mouth daily, Disp: , Rfl:     Oxiconazole Nitrate 1 % CREA, Apply topically, Disp: , Rfl:     Ciclopirox 1 % SHAM, Apply topically, Disp: , Rfl:     metroNIDAZOLE (METROGEL) 1 % gel, Apply topically daily Apply topically daily. , Disp: , Rfl:     gabapentin (NEURONTIN) 800 MG tablet, Take 800 mg by mouth 3 times daily, Disp: , Rfl:     Albuterol Sulfate (VENTOLIN HFA IN), Inhale 90 mcg into the lungs 2 times daily. , Disp: , Rfl:     imipramine (TOFRANIL) 25 MG tablet, Take 25 mg by mouth nightly.  , Disp: , Rfl:     aspirin 81 MG EC tablet, Take 81 mg by mouth daily. , Disp: , Rfl:     metoprolol (TOPROL-XL) 50 MG XL tablet, Take 100 mg by mouth daily. , Disp: , Rfl:     nitroGLYCERIN (NITROSTAT) 0.4 MG SL tablet, Place 0.4 mg under the tongue every 5 minutes as needed.   , Disp: , Rfl:     ropinirole (REQUIP) 1 MG tablet, Take 1 mg by mouth nightly.  , Disp: , Rfl:     methadone (DOLOPHINE) 5 MG tablet, Take 5 mg by mouth 2 times daily. , Disp: , Rfl:     lisinopril (PRINIVIL;ZESTRIL) 20 MG tablet, , Disp: , Rfl:       Objective:    Physical Exam:  Vitals:   BP (!) 158/79   Pulse 81   Temp 98.2 °F (36.8 °C)   Resp 16   Ht 6' (1.829 m)   Wt 265 lb 1.6 oz (120.2 kg)   SpO2 96%   BMI 35.95 kg/m²   Last 3 weights: Wt Readings from Last 3 Encounters:   09/02/21 265 lb 1.6 oz (120.2 kg)   03/04/21 263 lb (119.3 kg)   09/18/20 269 lb 12.8 oz (122.4 kg)     Body mass index is 35.95 kg/m². Physical Examination:   General appearance - alert, well appearing, and in no distress  Mental status - alert, oriented to person, place, and time  Eyes - pupils equal and reactive, extraocular eye movements intact  Nose - normal and patent, no erythema, discharge or polyps  Mouth - mucous membranes moist, Small oropharynx, large tongue, Mallampati 2  Neck - supple, no significant adenopathy, thick and short  Chest - No distress, retractions or cyanosis, bilateral symmetrical chest movement, air entry is bilateral without expiratory wheezing rhonchi or crackles. Heart - normal rate, regular rhythm, normal S1, S2, no murmurs, rubs, clicks or gallops  Abdomen - soft, nontender, nondistended, no masses or organomegaly  Neurological - alert, oriented, normal speech, no focal findings or movement disorder noted  Extremities - peripheral pulses normal, no pedal edema, no clubbing or cyanosis  Skin - normal coloration and turgor, no rashes, no suspicious skin lesions noted     Labs:  None    CBC: No results for input(s): WBC, HGB, PLT in the last 72 hours. BMP:  No results for input(s): NA, K, CL, CO2, BUN, CREATININE, GLUCOSE in the last 72 hours. S. Calcium:No results for input(s): CALCIUM in the last 72 hours. S. Ionized Calcium:No results for input(s): IONCA in the last 72 hours.   S. Magnesium:No 04/10/19  Stable 6 mm solid noncalcified nodule in the posterior segment right upper   lobe.        04/10/18  1. Stable 6 mm noncalcified pulmonary nodule right upper lobe   2. Stable prominent left hilar lymph node and enlarged pretracheal lymph node          5/4/17  1. Stable benign 6 mm pulmonary nodule right upper lobe   2. Stable prominent mediastinal lymph nodes       4/25/2016                                      1. 6 mm noncalcified pulmonary nodule right upper lobe   2. Prominent left hilar lymph node and enlarged pretracheal lymph node     I have reviewed the Ct chest and R paratracheal LN is small and likley benign and L Hilar LN difficult to evaluate without contrast      EKG:   ECHO:     Compliance data seen from 10/15/17 to 11/13/17 30 days  97% compliance on auto cpap 16/ 6 cm AHI 22 Central apnea 8/hr min  average use 6H and 4 min       Compliance data seen from march to April 2019 for 30 days  100% compliance on cpap 15 cm AHI 12 and average use 6H and 18    Compliance data from September to October 2019 for 30 days. Compliance 100% on CPAP 16 cm, average AHI 12.6    Compliance data   08/03/2021 to 08/31/2021  Compliance 100%. Compliance 100%  Average AHI 1.9  Average usage 5 hours 31 minutes  BiPAP pressure 25/21. Assessment:    ICD-10-CM    1. Complex sleep apnea syndrome  G47.31    2. Chronic obstructive pulmonary disease, unspecified COPD type ([facility] [address]  J44.9    3. Lung nodule  R91.1    4. Smoking greater than 40 pack years  F17.210    5. RLS (restless legs syndrome)  G25.81    6. Obesity (BMI 35.0-39.9 without comorbidity)  E66.9      CT chest from June 2021, June 2020 shows stable right upper lobe nodule and right lower lobe nodule and stable mediastinal adenopathy without much change in paratracheal lymph node since 2016. His symptoms of COPD/asthma has been stable. Unfortunately continues smoke discussed with complete smoking cessation.     PLAN:    Lung nodule is stable for 2 years on CT scan. Annual low-dose screening CT scan and next would be in June 2022 we will request on next visit. Completed smoking cessation discussed again today. Pulmonary function test from 2016 and from April 2019 showed almost stable finding on pulmonary function test  DuoNeb aerosol 3 times a day and as needed. Albuterol to be used as needed. Continue Requip at night. Flu vaccine recommended annually in fall. He had [employer] Covid vaccine  He will need booster of Pneumovax next year and Prevnar 13    Continue with BIPAP at 25/21. Continue BIPAP at least 4 hours nightly. Weight loss is recommended and discussed. Follow good sleep hygiene instruction given. Use humidifier. No prescription for BIPAP supplies needed at this time. Questions answered pertaining to diagnosis and management and explained importance of compliance with therapy. RTC in 6 months. Please note that this chart was generated using voice recognition Dragon dictation software. Although every effort was made to ensure the accuracy of this automated transcription, some errors in transcription may have occurred. Jaycee Nieto MD, MD             9/2/2021, 10:46 AM              Low Dose CT (LDCT) Lung Screening criteria met   Age 50-69   Pack year smoking >30   Still smoking or less than 15 year since quit   No sign or symptoms of lung cancer   > 11 months since last LDCT     Risks and benefits of lung cancer screening with LDCT scans discussed:    Significance of positive screen - False-positive LDCT results often occur. 95% of all positive results do not lead to a diagnosis of cancer. Usually further imaging can resolve most false-positive results; however, some patients may require invasive procedures. Over diagnosis risk - 10% to 12% of screen-detected lung cancer cases are over diagnosedthat is, the cancer would not have been detected in the patient's lifetime without the screening.     Need for follow up screens annually to continue lung cancer screening effectiveness     Risks associated with radiation from annual LDCT- Radiation exposure is about the same as for a mammogram, which is about 1/3 of the annual background radiation exposure from everyday life. Starting screening at age 54 is not likely to increase cancer risk from radiation exposure. Patients with comorbidities resulting in life expectancy of < 10 years, or that would preclude treatment of an abnormality identified on CT, should not be screened due to lack of benefit.     To obtain maximal benefit from this screening, smoking cessation and long-term abstinence from smoking is critical

## 2021-09-14 LAB — PROSTATE SPECIFIC ANTIGEN: 1.3 NG/ML

## 2021-09-15 DIAGNOSIS — Z12.5 SCREENING PSA (PROSTATE SPECIFIC ANTIGEN): ICD-10-CM

## 2021-09-17 ENCOUNTER — OFFICE VISIT (OUTPATIENT)
Dept: UROLOGY | Age: 71
End: 2021-09-17
Payer: MEDICARE

## 2021-09-17 VITALS
SYSTOLIC BLOOD PRESSURE: 140 MMHG | DIASTOLIC BLOOD PRESSURE: 78 MMHG | BODY MASS INDEX: 36.08 KG/M2 | WEIGHT: 266 LBS | TEMPERATURE: 97.7 F

## 2021-09-17 DIAGNOSIS — Z12.5 SCREENING PSA (PROSTATE SPECIFIC ANTIGEN): ICD-10-CM

## 2021-09-17 DIAGNOSIS — R68.89 ABNORMAL DIGITAL RECTAL EXAM: Primary | ICD-10-CM

## 2021-09-17 PROCEDURE — 99212 OFFICE O/P EST SF 10 MIN: CPT | Performed by: NURSE PRACTITIONER

## 2021-09-17 PROCEDURE — 99211 OFF/OP EST MAY X REQ PHY/QHP: CPT

## 2021-09-17 PROCEDURE — G8427 DOCREV CUR MEDS BY ELIG CLIN: HCPCS | Performed by: NURSE PRACTITIONER

## 2021-09-17 PROCEDURE — 4004F PT TOBACCO SCREEN RCVD TLK: CPT | Performed by: NURSE PRACTITIONER

## 2021-09-17 PROCEDURE — 3017F COLORECTAL CA SCREEN DOC REV: CPT | Performed by: NURSE PRACTITIONER

## 2021-09-17 PROCEDURE — 1123F ACP DISCUSS/DSCN MKR DOCD: CPT | Performed by: NURSE PRACTITIONER

## 2021-09-17 PROCEDURE — 4040F PNEUMOC VAC/ADMIN/RCVD: CPT | Performed by: NURSE PRACTITIONER

## 2021-09-17 PROCEDURE — G8417 CALC BMI ABV UP PARAM F/U: HCPCS | Performed by: NURSE PRACTITIONER

## 2021-09-17 ASSESSMENT — ENCOUNTER SYMPTOMS
EYE REDNESS: 0
VOMITING: 0
CONSTIPATION: 0
WHEEZING: 0
COLOR CHANGE: 0
ABDOMINAL PAIN: 0
NAUSEA: 0
BACK PAIN: 0
COUGH: 0
SHORTNESS OF BREATH: 0

## 2021-09-17 NOTE — PATIENT INSTRUCTIONS
SURVEY:    You may be receiving a survey from [employer] regarding your visit today. Please complete the survey to enable us to provide the highest quality of care to you and your family. If you cannot score us a very good on any question, please call the office to discuss how we could have made your experience a very good one. Thank you.   Keena

## 2021-09-17 NOTE — PROGRESS NOTES
HPI:          Patient is a 73 y.o. male in no acute distress. He is alert and oriented to person, place, and time. History  9/2018 Referred for abnormal MRI.  MRI of right hip showed \"decreased signal intensity superior lateral aspect prostate could be secondary to prostate neoplasm, further evaluation recommended. \" Patient does have one paternal uncle with prostate cancer.  This was diagnosed in his 76s.             PSA 1.37, TASHIA normal. No indications to proceed with biopsy. PSA  9/2021 - 1.30  9/2020 - 1.58  3/2020 - 1.68  9/2019 - 1.82  3/2019 - 2.14  9/2018 - 1.37    Today  Here today to follow-up for history of abnormal rectal exam.  Most recent PSA is 1.30. This is stable. He denies unintentional weight loss, decreased energy or appetite, new or worsening bone/hip/back pain. He denies any lower extremity numbness and tingling. He denies new or worsening LUTS. He denies gross hematuria or dysuria.      Past Medical History:   Diagnosis Date    Abnormal glucose     Actinic keratosis     Anxiety disorder     Back pain     COPD (chronic obstructive pulmonary disease) (HCC)     Coronary stent     Esophageal reflux     Heart failure, systolic (HCC)     Hyperlipidemia     Hypertension     Hypertension, essential, benign     Lumbar strain     Osteoporosis     Seasonal allergic rhinitis     Unspecified sleep apnea      Past Surgical History:   Procedure Laterality Date    COLONOSCOPY      CORONARY ANGIOPLASTY WITH STENT PLACEMENT      NECK SURGERY      x2    TONSILLECTOMY       Outpatient Encounter Medications as of 9/17/2021   Medication Sig Dispense Refill    lisinopril (PRINIVIL;ZESTRIL) 20 MG tablet Take 20 mg by mouth daily       metFORMIN (GLUCOPHAGE-XR) 500 MG extended release tablet Take 500 mg by mouth daily (with breakfast)      furosemide (LASIX) 20 MG tablet Take 20 mg by mouth daily      atorvastatin (LIPITOR) 20 MG tablet Take 20 mg by mouth daily      ipratropium-albuterol (DUONEB) 0.5-2.5 (3) MG/3ML SOLN nebulizer solution Inhale 3 mLs into the lungs three times daily 360 mL 11    acetaminophen (TYLENOL) 500 MG tablet Take 500 mg by mouth every 6 hours as needed for Pain      fluticasone (FLONASE) 50 MCG/ACT nasal spray 1 spray by Each Nostril route as needed       escitalopram (LEXAPRO) 20 MG tablet Take 20 mg by mouth daily      Oxiconazole Nitrate 1 % CREA Apply topically      Ciclopirox 1 % SHAM Apply topically      metroNIDAZOLE (METROGEL) 1 % gel Apply topically daily Apply topically daily.  gabapentin (NEURONTIN) 800 MG tablet Take 800 mg by mouth 3 times daily      Albuterol Sulfate (VENTOLIN HFA IN) Inhale 90 mcg into the lungs 2 times daily.  imipramine (TOFRANIL) 25 MG tablet Take 25 mg by mouth nightly.  aspirin 81 MG EC tablet Take 81 mg by mouth daily.  metoprolol (TOPROL-XL) 50 MG XL tablet Take 100 mg by mouth daily.  ropinirole (REQUIP) 1 MG tablet Take 1 mg by mouth nightly.  methadone (DOLOPHINE) 5 MG tablet Take 5 mg by mouth 2 times daily.  nitroGLYCERIN (NITROSTAT) 0.4 MG SL tablet Place 0.4 mg under the tongue every 5 minutes as needed. (Patient not taking: Reported on 9/17/2021)       No facility-administered encounter medications on file as of 9/17/2021.       Current Outpatient Medications on File Prior to Visit   Medication Sig Dispense Refill    lisinopril (PRINIVIL;ZESTRIL) 20 MG tablet Take 20 mg by mouth daily       metFORMIN (GLUCOPHAGE-XR) 500 MG extended release tablet Take 500 mg by mouth daily (with breakfast)      furosemide (LASIX) 20 MG tablet Take 20 mg by mouth daily      atorvastatin (LIPITOR) 20 MG tablet Take 20 mg by mouth daily      ipratropium-albuterol (DUONEB) 0.5-2.5 (3) MG/3ML SOLN nebulizer solution Inhale 3 mLs into the lungs three times daily 360 mL 11    acetaminophen (TYLENOL) 500 MG tablet Take 500 mg by mouth every 6 hours as needed for Pain  fluticasone (FLONASE) 50 MCG/ACT nasal spray 1 spray by Each Nostril route as needed       escitalopram (LEXAPRO) 20 MG tablet Take 20 mg by mouth daily      Oxiconazole Nitrate 1 % CREA Apply topically      Ciclopirox 1 % SHAM Apply topically      metroNIDAZOLE (METROGEL) 1 % gel Apply topically daily Apply topically daily.  gabapentin (NEURONTIN) 800 MG tablet Take 800 mg by mouth 3 times daily      Albuterol Sulfate (VENTOLIN HFA IN) Inhale 90 mcg into the lungs 2 times daily.  imipramine (TOFRANIL) 25 MG tablet Take 25 mg by mouth nightly.  aspirin 81 MG EC tablet Take 81 mg by mouth daily.  metoprolol (TOPROL-XL) 50 MG XL tablet Take 100 mg by mouth daily.  ropinirole (REQUIP) 1 MG tablet Take 1 mg by mouth nightly.  methadone (DOLOPHINE) 5 MG tablet Take 5 mg by mouth 2 times daily.  nitroGLYCERIN (NITROSTAT) 0.4 MG SL tablet Place 0.4 mg under the tongue every 5 minutes as needed. (Patient not taking: Reported on 9/17/2021)       No current facility-administered medications on file prior to visit. Patient has no known allergies. Family History   Problem Relation Age of Onset    Kidney Disease Mother      Social History     Tobacco Use   Smoking Status Current Every Day Smoker    Packs/day: 1.00    Years: 40.00    Pack years: 40.00    Types: Cigarettes    Start date: 1964   Smokeless Tobacco Never Used       Social History     Substance and Sexual Activity   Alcohol Use No       Review of Systems   Constitutional: Negative for appetite change, chills and fever. Eyes: Negative for redness and visual disturbance. Respiratory: Negative for cough, shortness of breath and wheezing. Cardiovascular: Negative for chest pain and leg swelling. Gastrointestinal: Negative for abdominal pain, constipation, nausea and vomiting.    Genitourinary: Negative for decreased urine volume, difficulty urinating, discharge, dysuria, enuresis, flank pain, frequency, hematuria, penile pain, scrotal swelling, testicular pain and urgency. Musculoskeletal: Negative for back pain, joint swelling and myalgias. Skin: Negative for color change, rash and wound. Neurological: Negative for dizziness, tremors and numbness. Hematological: Negative for adenopathy. Does not bruise/bleed easily. BP (!) 140/78 (Site: Right Upper Arm, Position: Sitting, Cuff Size: Large Adult)   Temp 97.7 °F (36.5 °C)   Wt 266 lb (120.7 kg)   BMI 36.08 kg/m²       PHYSICAL EXAM:  Constitutional: Patient in no acute distress; Neuro: alert and oriented to person place and time. Psych: Mood and affect normal.  Skin: Normal  Lungs: Respiratory effort normal  Cardiovascular:  Normal peripheral pulses  Abdomen: Soft, non-tender, non-distended with no CVA, flank pain  Bladder non-tender and not distended. No results found for: BUN  No results found for: CREATININE  Lab Results   Component Value Date    PSA 1.30 09/14/2021    PSA 1.58 09/15/2020    PSA 1.68 03/13/2020       ASSESSMENT:   Diagnosis Orders   1. Abnormal digital rectal exam     2. Screening PSA (prostate specific antigen)  PSA screening         PLAN:  TASHIA is not due until next year    We will see him in 1 year with a PSA and TASHIA.   He will call sooner if needed for any new or worsening symptoms

## 2022-06-02 ENCOUNTER — TELEPHONE (OUTPATIENT)
Dept: ONCOLOGY | Age: 72
End: 2022-06-02

## 2022-09-16 ENCOUNTER — OFFICE VISIT (OUTPATIENT)
Dept: UROLOGY | Age: 72
End: 2022-09-16
Payer: MEDICARE

## 2022-09-16 VITALS
SYSTOLIC BLOOD PRESSURE: 186 MMHG | BODY MASS INDEX: 36.21 KG/M2 | DIASTOLIC BLOOD PRESSURE: 98 MMHG | HEART RATE: 66 BPM | WEIGHT: 267 LBS

## 2022-09-16 DIAGNOSIS — N40.2 PROSTATE NODULE: ICD-10-CM

## 2022-09-16 DIAGNOSIS — N40.1 BPH WITH OBSTRUCTION/LOWER URINARY TRACT SYMPTOMS: Primary | ICD-10-CM

## 2022-09-16 DIAGNOSIS — R35.0 FREQUENCY OF URINATION: ICD-10-CM

## 2022-09-16 DIAGNOSIS — Z12.5 SCREENING PSA (PROSTATE SPECIFIC ANTIGEN): ICD-10-CM

## 2022-09-16 DIAGNOSIS — N13.8 BPH WITH OBSTRUCTION/LOWER URINARY TRACT SYMPTOMS: Primary | ICD-10-CM

## 2022-09-16 PROCEDURE — 3017F COLORECTAL CA SCREEN DOC REV: CPT | Performed by: NURSE PRACTITIONER

## 2022-09-16 PROCEDURE — 1123F ACP DISCUSS/DSCN MKR DOCD: CPT | Performed by: NURSE PRACTITIONER

## 2022-09-16 PROCEDURE — G8427 DOCREV CUR MEDS BY ELIG CLIN: HCPCS | Performed by: NURSE PRACTITIONER

## 2022-09-16 PROCEDURE — 99213 OFFICE O/P EST LOW 20 MIN: CPT | Performed by: NURSE PRACTITIONER

## 2022-09-16 PROCEDURE — 99211 OFF/OP EST MAY X REQ PHY/QHP: CPT

## 2022-09-16 PROCEDURE — G8417 CALC BMI ABV UP PARAM F/U: HCPCS | Performed by: NURSE PRACTITIONER

## 2022-09-16 PROCEDURE — 4004F PT TOBACCO SCREEN RCVD TLK: CPT | Performed by: NURSE PRACTITIONER

## 2022-09-16 ASSESSMENT — ENCOUNTER SYMPTOMS
NAUSEA: 0
ABDOMINAL PAIN: 0
BACK PAIN: 0
EYE REDNESS: 0
CONSTIPATION: 0
COUGH: 0
VOMITING: 0
WHEEZING: 0
SHORTNESS OF BREATH: 0
COLOR CHANGE: 0

## 2022-09-16 NOTE — PROGRESS NOTES
HPI:          Patient is a 81 y.o. male in no acute distress. He is alert and oriented to person, place, and time. History  9/2018 Referred for abnormal MRI. MRI of right hip showed \"decreased signal intensity superior lateral aspect prostate could be secondary to prostate neoplasm, further evaluation recommended. \" Patient does have one paternal uncle with prostate cancer. This was diagnosed in his 76s. PSA 1.37, TASHIA normal. No indications to proceed with biopsy. PSA  9/2022 - 1.26  9/2021 - 1.30  9/2020 - 1.58  3/2020 - 1.68  9/2019 - 1.82  3/2019 - 2.14  9/2018 - 1.37    Today  Here today to follow-up for prostate cancer screening and BPH. Most recent PSA is 1.26. This is stable. He denies unintentional weight loss, decreased energy or appetite, new or worsening bone/hip/back pain. He denies any lower extremity numbness and tingling. He denies new or worsening LUTS. He does urinate every two hours during the day. He denies gross hematuria or dysuria.      Past Medical History:   Diagnosis Date    Abnormal glucose     Actinic keratosis     Anxiety disorder     Back pain     COPD (chronic obstructive pulmonary disease) (HCC)     Coronary stent     Esophageal reflux     Heart failure, systolic (HCC)     Hyperlipidemia     Hypertension     Hypertension, essential, benign     Lumbar strain     Osteoporosis     Seasonal allergic rhinitis     Unspecified sleep apnea      Past Surgical History:   Procedure Laterality Date    COLONOSCOPY      CORONARY ANGIOPLASTY WITH STENT PLACEMENT      NECK SURGERY      x2    TONSILLECTOMY       Outpatient Encounter Medications as of 9/16/2022   Medication Sig Dispense Refill    lisinopril (PRINIVIL;ZESTRIL) 20 MG tablet Take 20 mg by mouth daily       metFORMIN (GLUCOPHAGE-XR) 500 MG extended release tablet Take 500 mg by mouth daily (with breakfast)      furosemide (LASIX) 20 MG tablet Take 20 mg by mouth daily      atorvastatin (LIPITOR) 20 MG tablet Take 20 mg by mouth daily      ipratropium-albuterol (DUONEB) 0.5-2.5 (3) MG/3ML SOLN nebulizer solution Inhale 3 mLs into the lungs three times daily 360 mL 11    acetaminophen (TYLENOL) 500 MG tablet Take 500 mg by mouth every 6 hours as needed for Pain      fluticasone (FLONASE) 50 MCG/ACT nasal spray 1 spray by Each Nostril route as needed       escitalopram (LEXAPRO) 20 MG tablet Take 20 mg by mouth daily      Oxiconazole Nitrate 1 % CREA Apply topically      Ciclopirox 1 % SHAM Apply topically      metroNIDAZOLE (METROGEL) 1 % gel Apply topically daily Apply topically daily. gabapentin (NEURONTIN) 800 MG tablet Take 800 mg by mouth 3 times daily      imipramine (TOFRANIL) 25 MG tablet Take 25 mg by mouth nightly. aspirin 81 MG EC tablet Take 81 mg by mouth daily. metoprolol (TOPROL-XL) 50 MG XL tablet Take 100 mg by mouth daily. ropinirole (REQUIP) 1 MG tablet Take 1 mg by mouth nightly. methadone (DOLOPHINE) 5 MG tablet Take 5 mg by mouth 2 times daily. Albuterol Sulfate (VENTOLIN HFA IN) Inhale 90 mcg into the lungs 2 times daily. nitroGLYCERIN (NITROSTAT) 0.4 MG SL tablet Place 0.4 mg under the tongue every 5 minutes as needed. (Patient not taking: No sig reported)       No facility-administered encounter medications on file as of 9/16/2022.       Current Outpatient Medications on File Prior to Visit   Medication Sig Dispense Refill    lisinopril (PRINIVIL;ZESTRIL) 20 MG tablet Take 20 mg by mouth daily       metFORMIN (GLUCOPHAGE-XR) 500 MG extended release tablet Take 500 mg by mouth daily (with breakfast)      furosemide (LASIX) 20 MG tablet Take 20 mg by mouth daily      atorvastatin (LIPITOR) 20 MG tablet Take 20 mg by mouth daily      ipratropium-albuterol (DUONEB) 0.5-2.5 (3) MG/3ML SOLN nebulizer solution Inhale 3 mLs into the lungs three times daily 360 mL 11    acetaminophen (TYLENOL) 500 MG tablet Take 500 mg by mouth every 6 hours as needed for Pain fluticasone (FLONASE) 50 MCG/ACT nasal spray 1 spray by Each Nostril route as needed       escitalopram (LEXAPRO) 20 MG tablet Take 20 mg by mouth daily      Oxiconazole Nitrate 1 % CREA Apply topically      Ciclopirox 1 % SHAM Apply topically      metroNIDAZOLE (METROGEL) 1 % gel Apply topically daily Apply topically daily. gabapentin (NEURONTIN) 800 MG tablet Take 800 mg by mouth 3 times daily      imipramine (TOFRANIL) 25 MG tablet Take 25 mg by mouth nightly. aspirin 81 MG EC tablet Take 81 mg by mouth daily. metoprolol (TOPROL-XL) 50 MG XL tablet Take 100 mg by mouth daily. ropinirole (REQUIP) 1 MG tablet Take 1 mg by mouth nightly. methadone (DOLOPHINE) 5 MG tablet Take 5 mg by mouth 2 times daily. Albuterol Sulfate (VENTOLIN HFA IN) Inhale 90 mcg into the lungs 2 times daily. nitroGLYCERIN (NITROSTAT) 0.4 MG SL tablet Place 0.4 mg under the tongue every 5 minutes as needed. (Patient not taking: No sig reported)       No current facility-administered medications on file prior to visit. Patient has no known allergies. Family History   Problem Relation Age of Onset    Kidney Disease Mother      Social History     Tobacco Use   Smoking Status Every Day    Packs/day: 1.00    Years: 40.00    Pack years: 40.00    Types: Cigarettes    Start date: 1964   Smokeless Tobacco Never       Social History     Substance and Sexual Activity   Alcohol Use No       Review of Systems   Constitutional:  Negative for appetite change, chills and fever. Eyes:  Negative for redness and visual disturbance. Respiratory:  Negative for cough, shortness of breath and wheezing. Cardiovascular:  Negative for chest pain and leg swelling. Gastrointestinal:  Negative for abdominal pain, constipation, nausea and vomiting.    Genitourinary:  Negative for decreased urine volume, difficulty urinating, dysuria, enuresis, flank pain, frequency, hematuria, penile discharge, penile pain, scrotal swelling, testicular pain and urgency. Musculoskeletal:  Negative for back pain, joint swelling and myalgias. Skin:  Negative for color change, rash and wound. Neurological:  Negative for dizziness, tremors and numbness. Hematological:  Negative for adenopathy. Does not bruise/bleed easily. BP (!) 186/98 (Site: Left Upper Arm, Position: Sitting, Cuff Size: Large Adult)   Pulse 66   Wt 267 lb (121.1 kg)   BMI 36.21 kg/m²       PHYSICAL EXAM:  Constitutional: Patient in no acute distress; Neuro: alert and oriented to person place and time. Psych: Mood and affect normal.  Skin: Normal  Lungs: Respiratory effort normal  Cardiovascular:  Normal peripheral pulses  Abdomen: Soft, non-tender, non-distended with no CVA, flank pain  Bladder non-tender and not distended. Lymphatics: no palpable lymphadenopathy  Penis normal  Urethral meatus normal  Scrotal exam normal  Testicles normal bilaterally  Epididymis normal bilaterally  No evidence of inguinal hernia  Anus and perineum normal  Normal rectal tone with no masses  Prostate soft, non-tender to palpation. No palpable nodules. Estimated 40 grams. Seminal vesicles not palpable. No results found for: BUN  No results found for: CREATININE  Lab Results   Component Value Date    PSA 1.30 09/14/2021    PSA 1.58 09/15/2020    PSA 1.68 03/13/2020       ASSESSMENT:   Diagnosis Orders   1. BPH with obstruction/lower urinary tract symptoms        2. Screening PSA (prostate specific antigen)  PSA Screening      3. Prostate nodule        4.  Frequency of urination              PLAN:  F/U in one year with a PSA prior    TASHIA due in two years

## 2023-09-18 ENCOUNTER — OFFICE VISIT (OUTPATIENT)
Dept: UROLOGY | Age: 73
End: 2023-09-18
Payer: MEDICARE

## 2023-09-18 VITALS
HEIGHT: 72 IN | BODY MASS INDEX: 35.08 KG/M2 | DIASTOLIC BLOOD PRESSURE: 89 MMHG | SYSTOLIC BLOOD PRESSURE: 165 MMHG | HEART RATE: 67 BPM | WEIGHT: 259 LBS | RESPIRATION RATE: 18 BRPM | TEMPERATURE: 98.2 F

## 2023-09-18 DIAGNOSIS — N13.8 BPH WITH OBSTRUCTION/LOWER URINARY TRACT SYMPTOMS: ICD-10-CM

## 2023-09-18 DIAGNOSIS — N40.1 BPH WITH OBSTRUCTION/LOWER URINARY TRACT SYMPTOMS: ICD-10-CM

## 2023-09-18 DIAGNOSIS — Z12.5 SCREENING PSA (PROSTATE SPECIFIC ANTIGEN): Primary | ICD-10-CM

## 2023-09-18 PROCEDURE — G8427 DOCREV CUR MEDS BY ELIG CLIN: HCPCS | Performed by: PHYSICIAN ASSISTANT

## 2023-09-18 PROCEDURE — G8417 CALC BMI ABV UP PARAM F/U: HCPCS | Performed by: PHYSICIAN ASSISTANT

## 2023-09-18 PROCEDURE — 4004F PT TOBACCO SCREEN RCVD TLK: CPT | Performed by: PHYSICIAN ASSISTANT

## 2023-09-18 PROCEDURE — 1123F ACP DISCUSS/DSCN MKR DOCD: CPT | Performed by: PHYSICIAN ASSISTANT

## 2023-09-18 PROCEDURE — 3017F COLORECTAL CA SCREEN DOC REV: CPT | Performed by: PHYSICIAN ASSISTANT

## 2023-09-18 PROCEDURE — 99213 OFFICE O/P EST LOW 20 MIN: CPT | Performed by: PHYSICIAN ASSISTANT

## 2023-09-18 RX ORDER — METOPROLOL TARTRATE 50 MG/1
TABLET, FILM COATED ORAL
COMMUNITY
Start: 2023-08-21

## 2023-09-18 RX ORDER — LISINOPRIL 30 MG/1
TABLET ORAL
COMMUNITY
Start: 2023-08-21

## 2023-09-18 ASSESSMENT — ENCOUNTER SYMPTOMS
CONSTIPATION: 0
SHORTNESS OF BREATH: 0
BACK PAIN: 0
COUGH: 0
WHEEZING: 0
EYE REDNESS: 0
VOMITING: 0
NAUSEA: 0
COLOR CHANGE: 0
ABDOMINAL PAIN: 0

## 2023-09-18 NOTE — PROGRESS NOTES
HPI:      Patient is a 72 y.o. male in no acute distress. He is alert and oriented to person, place, and time. History  9/2018 Referred for abnormal MRI. MRI of right hip showed \"decreased signal intensity superior lateral aspect prostate could be secondary to prostate neoplasm, further evaluation recommended. \" Patient does have one paternal uncle with prostate cancer. This was diagnosed in his 76s. PSA 1.37, TASHIA normal. No indications to proceed with biopsy. PSA  9/2023 - 1.53  9/2022 - 1.26  9/2021 - 1.30  9/2020 - 1.58  3/2020 - 1.68  9/2019 - 1.82  3/2019 - 2.14  9/2018 - 1.37    Today  Patient is here today to follow-up for prostate cancer screening and BPH. He is not on any medications from our office. Patient did have a PSA prior to today which was 1.53. This is up slightly from last year but overall stable. He denies unintentional weight loss, decreased energy or appetite, new or worsening bone/hip/back pain. He denies any lower extremity numbness and tingling. He denies new or worsening LUTS. He does have baseline nocturia x1. This is not bothersome for him. He denies gross hematuria or dysuria.      Past Medical History:   Diagnosis Date    Abnormal glucose     Actinic keratosis     Anxiety disorder     Back pain     COPD (chronic obstructive pulmonary disease) (HCC)     Coronary stent     Esophageal reflux     Heart failure, systolic (HCC)     Hyperlipidemia     Hypertension     Hypertension, essential, benign     Lumbar strain     Osteoporosis     Seasonal allergic rhinitis     Unspecified sleep apnea      Past Surgical History:   Procedure Laterality Date    COLONOSCOPY      CORONARY ANGIOPLASTY WITH STENT PLACEMENT      NECK SURGERY      x2    TONSILLECTOMY       Outpatient Encounter Medications as of 9/18/2023   Medication Sig Dispense Refill    lisinopril (PRINIVIL;ZESTRIL) 30 MG tablet       metFORMIN (GLUCOPHAGE) 500 MG tablet       metoprolol tartrate (LOPRESSOR) 50

## 2023-11-13 ENCOUNTER — OFFICE VISIT (OUTPATIENT)
Dept: UROLOGY | Age: 73
End: 2023-11-13
Payer: MEDICARE

## 2023-11-13 VITALS
DIASTOLIC BLOOD PRESSURE: 76 MMHG | TEMPERATURE: 97.1 F | WEIGHT: 258 LBS | SYSTOLIC BLOOD PRESSURE: 138 MMHG | BODY MASS INDEX: 34.99 KG/M2

## 2023-11-13 DIAGNOSIS — R31.0 GROSS HEMATURIA: Primary | ICD-10-CM

## 2023-11-13 PROCEDURE — 1123F ACP DISCUSS/DSCN MKR DOCD: CPT | Performed by: UROLOGY

## 2023-11-13 PROCEDURE — G8427 DOCREV CUR MEDS BY ELIG CLIN: HCPCS | Performed by: UROLOGY

## 2023-11-13 PROCEDURE — 4004F PT TOBACCO SCREEN RCVD TLK: CPT | Performed by: UROLOGY

## 2023-11-13 PROCEDURE — G8417 CALC BMI ABV UP PARAM F/U: HCPCS | Performed by: UROLOGY

## 2023-11-13 PROCEDURE — G8484 FLU IMMUNIZE NO ADMIN: HCPCS | Performed by: UROLOGY

## 2023-11-13 PROCEDURE — 99214 OFFICE O/P EST MOD 30 MIN: CPT | Performed by: UROLOGY

## 2023-11-13 PROCEDURE — 3017F COLORECTAL CA SCREEN DOC REV: CPT | Performed by: UROLOGY

## 2023-11-13 NOTE — PROGRESS NOTES
HPI:          Patient is a 72 y.o. male in no acute distress. He is alert and oriented to person, place, and time. History  9/2018 Referred for abnormal MRI. MRI of right hip showed \"decreased signal intensity superior lateral aspect prostate could be secondary to prostate neoplasm, further evaluation recommended. \" Patient does have one paternal uncle with prostate cancer. This was diagnosed in his 76s. PSA 1.37, TASHIA normal. No indications to proceed with biopsy. PSA  9/2023 - 1.53  9/2022 - 1.26  9/2021 - 1.30  9/2020 - 1.58  3/2020 - 1.68  9/2019 - 1.82  3/2019 - 2.14  9/2018 - 1.37     Currently  Pt had an episode of gross hematuria. Pt did have a ct scan done at PCP office. Patient did have urine culture and was started on antibiotics. He does state that since starting the antibiotics he has had no more gross hematuria. He reports no flank pain nausea vomiting. Patient's CT scan was independently reviewed today. This does not show any significant  abnormalities. Patient has no more pain. He is going well. We have seen the patient in the office within the past 6 weeks. No pain today.     Past Medical History:   Diagnosis Date    Abnormal glucose     Actinic keratosis     Anxiety disorder     Back pain     COPD (chronic obstructive pulmonary disease) (HCC)     Coronary stent     Esophageal reflux     Heart failure, systolic (HCC)     Hyperlipidemia     Hypertension     Hypertension, essential, benign     Lumbar strain     Osteoporosis     Seasonal allergic rhinitis     Unspecified sleep apnea      Past Surgical History:   Procedure Laterality Date    COLONOSCOPY      CORONARY ANGIOPLASTY WITH STENT PLACEMENT      NECK SURGERY      x2    TONSILLECTOMY       Outpatient Encounter Medications as of 11/13/2023   Medication Sig Dispense Refill    lisinopril (PRINIVIL;ZESTRIL) 30 MG tablet       metFORMIN (GLUCOPHAGE-XR) 500 MG extended release tablet Take 1 tablet by mouth in

## 2023-11-13 NOTE — PATIENT INSTRUCTIONS
Survey: You may be receiving a survey from [employer] regarding your visit today. Please complete the survey to enable us to provide the highest quality of care to you and your family.     If you cannot score us as very good on any question, please call the office to discuss how we could have major experience exceptional.    Thank you    Your Urology Care Team.

## 2023-11-29 ENCOUNTER — PROCEDURE VISIT (OUTPATIENT)
Dept: UROLOGY | Age: 73
End: 2023-11-29
Payer: MEDICARE

## 2023-11-29 VITALS
HEART RATE: 61 BPM | BODY MASS INDEX: 34.72 KG/M2 | TEMPERATURE: 98 F | WEIGHT: 256 LBS | DIASTOLIC BLOOD PRESSURE: 109 MMHG | SYSTOLIC BLOOD PRESSURE: 177 MMHG

## 2023-11-29 DIAGNOSIS — N13.8 BPH WITH OBSTRUCTION/LOWER URINARY TRACT SYMPTOMS: ICD-10-CM

## 2023-11-29 DIAGNOSIS — R31.0 GROSS HEMATURIA: Primary | ICD-10-CM

## 2023-11-29 DIAGNOSIS — N40.1 BPH WITH OBSTRUCTION/LOWER URINARY TRACT SYMPTOMS: ICD-10-CM

## 2023-11-29 PROCEDURE — 99213 OFFICE O/P EST LOW 20 MIN: CPT | Performed by: UROLOGY

## 2023-11-29 PROCEDURE — G8417 CALC BMI ABV UP PARAM F/U: HCPCS | Performed by: UROLOGY

## 2023-11-29 PROCEDURE — 1123F ACP DISCUSS/DSCN MKR DOCD: CPT | Performed by: UROLOGY

## 2023-11-29 PROCEDURE — G8484 FLU IMMUNIZE NO ADMIN: HCPCS | Performed by: UROLOGY

## 2023-11-29 PROCEDURE — G8427 DOCREV CUR MEDS BY ELIG CLIN: HCPCS | Performed by: UROLOGY

## 2023-11-29 PROCEDURE — 3017F COLORECTAL CA SCREEN DOC REV: CPT | Performed by: UROLOGY

## 2023-11-29 PROCEDURE — 52000 CYSTOURETHROSCOPY: CPT | Performed by: UROLOGY

## 2023-11-29 PROCEDURE — 4004F PT TOBACCO SCREEN RCVD TLK: CPT | Performed by: UROLOGY

## 2023-11-29 ASSESSMENT — ENCOUNTER SYMPTOMS
NAUSEA: 0
VOMITING: 0
WHEEZING: 0
SHORTNESS OF BREATH: 0
COUGH: 0
BACK PAIN: 0
CONSTIPATION: 0
ABDOMINAL PAIN: 0
EYE REDNESS: 0
COLOR CHANGE: 0

## 2023-11-29 NOTE — PATIENT INSTRUCTIONS
Survey: You may be receiving a survey from [employer] regarding your visit today. Please complete the survey to enable us to provide the highest quality of care to you and your family.     If you cannot score us as very good on any question, please call the office to discuss how we could have major experience exceptional.    Thank you    Your Urology Care Team.

## 2023-11-29 NOTE — PROGRESS NOTES
During cystoscopy the following was utilized on patient with no adverse affects:    45% SODIUM CHLORIDE 500 ML BAG  Lot number: [id number]   Expiration date: 10/2024      LIDOCAINE HYDROCHLORIDE JELLY 2%   Lot number: [id number]  Expiration date: 05/2025      CYSTOSCOPE  Lot :  [id number]  EXP 05/25/2026

## 2023-11-29 NOTE — PROGRESS NOTES
HPI:          Patient is a 72 y.o. male in no acute distress. He is alert and oriented to person, place, and time. 9/2018 Referred for abnormal MRI. MRI of right hip showed \"decreased signal intensity superior lateral aspect prostate could be secondary to prostate neoplasm, further evaluation recommended. \" Patient does have one paternal uncle with prostate cancer. This was diagnosed in his 76s. PSA 1.37, TASHIA normal. No indications to proceed with biopsy. PSA  9/2023 - 1.53  9/2022 - 1.26  9/2021 - 1.30  9/2020 - 1.58  3/2020 - 1.68  9/2019 - 1.82  3/2019 - 2.14  9/2018 - 1.37     Currently  Patient is here today for lower tract visualization. Secondary to gross hematuria. Patient did have recent CT scan. This was independently reviewed. This did not show any significant  abnormalities. Patient has been doing well. No recent gross hematuria or dysuria. We do see the patient on an annual basis for abnormal MRI. No pain today    Cystoscopy Procedure Note    Pre-operative Diagnosis: gross hematuria    Post-operative Diagnosis: Same     Surgeon: Scooby Shannon    Assistants: None    Anesthesia : Local    Procedure Details   The risks, benefits, complications, treatment options, and expected outcomes were discussed with the patient. The patient concurred with the proposed plan, giving informed consent. Cystoscopy was performed today under local anesthesia, using sterile technique. The patient was placed in the dorsal lithotomy position, prepped with CHG, and draped in the usual sterile fashion. A 14 [place]ian flexible cystoscope was used to systematically inspect both the urethra and bladder in their entirety. Findings:  Anterior urethra: normal without strictures  Hyperplasia: bilobar  Bladder: Normal mucosa, without lesions.   Ureteral orifice(s) was/were seen in the normal position and effluxing clear urine  Trabeculations No  Diverticulum No  Description: Bilobar hyperplasia no

## 2024-09-18 LAB — PROSTATE SPECIFIC ANTIGEN: 1.74 NG/ML

## 2024-09-19 ENCOUNTER — HOSPITAL ENCOUNTER (OUTPATIENT)
Age: 74
Setting detail: SPECIMEN
Discharge: HOME | End: 2024-09-19
Payer: MEDICARE

## 2024-09-19 ENCOUNTER — OFFICE VISIT (OUTPATIENT)
Dept: UROLOGY | Age: 74
End: 2024-09-19

## 2024-09-19 VITALS
SYSTOLIC BLOOD PRESSURE: 145 MMHG | DIASTOLIC BLOOD PRESSURE: 90 MMHG | BODY MASS INDEX: 32.01 KG/M2 | WEIGHT: 236 LBS | TEMPERATURE: 98.7 F

## 2024-09-19 DIAGNOSIS — Z12.5 SCREENING PSA (PROSTATE SPECIFIC ANTIGEN): ICD-10-CM

## 2024-09-19 DIAGNOSIS — R31.0 GROSS HEMATURIA: Primary | ICD-10-CM

## 2024-09-19 DIAGNOSIS — Z12.5 SCREENING PSA (PROSTATE SPECIFIC ANTIGEN): Primary | ICD-10-CM

## 2024-09-19 DIAGNOSIS — R31.0 GROSS HEMATURIA: ICD-10-CM

## 2024-09-19 LAB
BACTERIA URNS QL MICRO: ABNORMAL
BILIRUB UR QL STRIP: NEGATIVE
CLARITY UR: CLEAR
COLOR UR: YELLOW
EPI CELLS #/AREA URNS HPF: ABNORMAL /HPF (ref 0–5)
GLUCOSE UR STRIP-MCNC: NEGATIVE MG/DL
HGB UR QL STRIP.AUTO: NEGATIVE
KETONES UR STRIP-MCNC: NEGATIVE MG/DL
LEUKOCYTE ESTERASE UR QL STRIP: NEGATIVE
NITRITE UR QL STRIP: NEGATIVE
PH UR STRIP: 7 [PH] (ref 5–9)
PROT UR STRIP-MCNC: NEGATIVE MG/DL
RBC #/AREA URNS HPF: ABNORMAL /HPF (ref 0–2)
SP GR UR STRIP: <1.005 (ref 1.01–1.02)
UROBILINOGEN UR STRIP-ACNC: NORMAL EU/DL (ref 0–1)
WBC #/AREA URNS HPF: ABNORMAL /HPF (ref 0–5)

## 2024-09-19 PROCEDURE — 81001 URINALYSIS AUTO W/SCOPE: CPT

## 2024-09-20 ENCOUNTER — TELEPHONE (OUTPATIENT)
Dept: UROLOGY | Age: 74
End: 2024-09-20

## 2025-05-05 ENCOUNTER — CLINICAL DOCUMENTATION (OUTPATIENT)
Dept: PALLATIVE CARE | Age: 75
End: 2025-05-05

## 2025-05-05 NOTE — PROGRESS NOTES
New ACP documents completed with  on 5/2/2025. Documents scanned into EHR and health care decision makers updated.     Sofiya Arcos RN  [facility]   Palliative Care Nurse Coordinator  5/5/2025 7:40 AM